# Patient Record
Sex: FEMALE | NOT HISPANIC OR LATINO | Employment: UNEMPLOYED | URBAN - METROPOLITAN AREA
[De-identification: names, ages, dates, MRNs, and addresses within clinical notes are randomized per-mention and may not be internally consistent; named-entity substitution may affect disease eponyms.]

---

## 2019-11-14 NOTE — PROGRESS NOTES
"  Adela is a 54 year old No obstetric history on file. female who presents for annual exam.     Besides routine health maintenance, {NO OTHER:889185}.    HPI:  The patient's PCP is *** Fei Roldan MD.  ***      GYNECOLOGIC HISTORY:    No LMP recorded.    Regular menses? { :814544}  Menses every *** days.  Length of menses: {NUMBERS 1-16:10} days    Her current contraception method is: {PLC CONTRACEPTION:463500}.  She  reports that she has never smoked. She does not have any smokeless tobacco history on file.  {Tobacco Cessation -- Delete if patient is a non-smoker:119225}  Patient {IS/IS NOT:9024} sexually active.  STD testing offered?  {GC/CHLAMYDIA:741769}  Last PHQ-9 score on record = No flowsheet data found.  Last GAD7 score on record = No flowsheet data found.  Alcohol Score = ***    HEALTH MAINTENANCE:  Cholesterol: (No results found for: CHOL ***  Last Mammo: {[facility] mammo:541031::\"One year ago\"}, Result: {[facility] normal:597994::\"Normal\"}, Next Mammo: {[facility] next mammo:543760::\"Today\"} ***  Pap: (No results found for: PAP )  ***  Colonoscopy:  ***, Result: {[facility] normal:431032::\"Normal\"}, Next Colonoscopy: *** years.  Dexa:  ***    Health maintenance updated:  {yes no:417480}    HISTORY:  OB History   No obstetric history on file.       Patient Active Problem List   Diagnosis     Blood disorder     No past surgical history on file.   Social History     Tobacco Use     Smoking status: Never Smoker   Substance Use Topics     Alcohol use: Yes     Comment: occasional      Problem (# of Occurrences) Relation (Name,Age of Onset)    Hypertension (1) Father: mild            Current Outpatient Medications   Medication Sig     ASPIRIN 81 MG PO TABS 1 TABLET DAILY     CALCIUM 500 MG PO TABS 1 TABLET THREE TIMES A DAY WITH MEALS     FEXOFENADINE  MG PO TABS 1 TABLET DAILY     FOLIC ACID 0.8 MG PO CAPS TWICE A DAY     FOSAMAX 35 MG PO TABS 1 TABLET WEEKLY ON AN EMPTY STOMACH     SYNTHROID 50 MCG PO TABS 1 TABLET DAILY     No " "current facility-administered medications for this visit.      Allergies   Allergen Reactions     Codeine GI Disturbance     Sulfa Drugs Rash       Past medical, surgical, social and family histories were reviewed and updated in EPIC.    ROS:   {[facility] ROSGYN:476672::\"12 point review of systems negative other than symptoms noted below.\"}    EXAM:  There were no vitals taken for this visit.   BMI: There is no height or weight on file to calculate BMI.    PHYSICAL EXAM:  Constitutional:   Appearance: Well nourished, well developed, alert, in no acute distress  Neck:  Lymph Nodes:  No lymphadenopathy present    Thyroid:  Gland size normal, nontender, no nodules or masses present  on palpation  Chest:  Respiratory Effort:  Breathing unlabored  Cardiovascular:    Heart: Auscultation:  Regular rate, normal rhythm, no murmurs present  Breasts: {[facility] Breast exam:320382}  Gastrointestinal:   Abdominal Examination:  Abdomen nontender to palpation, tone normal without rigidity or guarding, no masses present, umbilicus without lesions   Liver and Spleen:  No hepatomegaly present, liver nontender to palpation    Hernias:  No hernias present  Lymphatic: Lymph Nodes:  No other lymphadenopathy present  Skin:  General Inspection:  No rashes present, no lesions present, no areas of  discoloration  Neurologic:    Mental Status:  Oriented X3.  Normal strength and tone, sensory exam                grossly normal, mentation intact and speech normal.    Psychiatric:   Mentation appears normal and affect normal/bright.         {[facility] Pelvic Exam:401489}    COUNSELING:   {FEMALE COUNSELING MESSAGES:439464::\"Reviewed preventive health counseling, as reflected in patient instructions\"}    BMI: There is no height or weight on file to calculate BMI.  {Obesity Action Plan -- Delete if BMA < 25:577377}    ASSESSMENT:  54 year old female with satisfactory annual exam.    ICD-10-CM    1. Encounter for gynecological examination without abnormal finding " "Z01.419        PLAN:  ***    Harrison Alcocer MD  Adela is a 54 year old No obstetric history on file. female who presents for annual exam.     Besides routine health maintenance, {NO OTHER:541173}.    HPI:  The patient's PCP is *** Fei Roldan MD.  ***      GYNECOLOGIC HISTORY:    No LMP recorded.    Regular menses? { :071833}  Menses every *** days.  Length of menses: {NUMBERS 1-16:10} days    Her current contraception method is: {PLC CONTRACEPTION:665216}.  She  reports that she has never smoked. She does not have any smokeless tobacco history on file.  {Tobacco Cessation -- Delete if patient is a non-smoker:310815}  Patient {IS/IS NOT:9024} sexually active.  STD testing offered?  {GC/CHLAMYDIA:101142}  Last PHQ-9 score on record = No flowsheet data found.  Last GAD7 score on record = No flowsheet data found.  Alcohol Score = ***    HEALTH MAINTENANCE:  Cholesterol: (No results found for: CHOL ***  Last Mammo: {[facility] mammo:612037::\"One year ago\"}, Result: {[facility] normal:252256::\"Normal\"}, Next Mammo: {[facility] next mammo:978605::\"Today\"} ***  Pap: (No results found for: PAP )  ***  Colonoscopy:  ***, Result: {[facility] normal:976892::\"Normal\"}, Next Colonoscopy: *** years.  Dexa:  ***    Health maintenance updated:  {yes no:071826}    HISTORY:  OB History   No obstetric history on file.       Patient Active Problem List   Diagnosis     Blood disorder     No past surgical history on file.   Social History     Tobacco Use     Smoking status: Never Smoker   Substance Use Topics     Alcohol use: Yes     Comment: occasional      Problem (# of Occurrences) Relation (Name,Age of Onset)    Hypertension (1) Father: mild            Current Outpatient Medications   Medication Sig     ASPIRIN 81 MG PO TABS 1 TABLET DAILY     CALCIUM 500 MG PO TABS 1 TABLET THREE TIMES A DAY WITH MEALS     FEXOFENADINE  MG PO TABS 1 TABLET DAILY     FOLIC ACID 0.8 MG PO CAPS TWICE A DAY     FOSAMAX 35 MG PO TABS 1 TABLET WEEKLY ON AN EMPTY STOMACH " "    SYNTHROID 50 MCG PO TABS 1 TABLET DAILY     No current facility-administered medications for this visit.      Allergies   Allergen Reactions     Codeine GI Disturbance     Sulfa Drugs Rash       Past medical, surgical, social and family histories were reviewed and updated in EPIC.    ROS:   {[facility] ROSGYN:006738::\"12 point review of systems negative other than symptoms noted below.\"}    EXAM:  There were no vitals taken for this visit.   BMI: There is no height or weight on file to calculate BMI.    PHYSICAL EXAM:  Constitutional:   Appearance: Well nourished, well developed, alert, in no acute distress  Neck:  Lymph Nodes:  No lymphadenopathy present    Thyroid:  Gland size normal, nontender, no nodules or masses present  on palpation  Chest:  Respiratory Effort:  Breathing unlabored  Cardiovascular:    Heart: Auscultation:  Regular rate, normal rhythm, no murmurs present  Breasts: {[facility] Breast exam:310867}  Gastrointestinal:   Abdominal Examination:  Abdomen nontender to palpation, tone normal without rigidity or guarding, no masses present, umbilicus without lesions   Liver and Spleen:  No hepatomegaly present, liver nontender to palpation    Hernias:  No hernias present  Lymphatic: Lymph Nodes:  No other lymphadenopathy present  Skin:  General Inspection:  No rashes present, no lesions present, no areas of  discoloration  Neurologic:    Mental Status:  Oriented X3.  Normal strength and tone, sensory exam                grossly normal, mentation intact and speech normal.    Psychiatric:   Mentation appears normal and affect normal/bright.         {[facility] Pelvic Exam:806545}    COUNSELING:   {FEMALE COUNSELING MESSAGES:488584::\"Reviewed preventive health counseling, as reflected in patient instructions\"}    BMI: There is no height or weight on file to calculate BMI.  {Obesity Action Plan -- Delete if BMA < 25:297974}    ASSESSMENT:  54 year old female with satisfactory annual exam.    ICD-10-CM    1. Encounter for " gynecological examination without abnormal finding Z01.419        PLAN:  ***    Harrison Alcocer MD

## 2019-11-18 ENCOUNTER — OFFICE VISIT (OUTPATIENT)
Dept: OBGYN | Facility: CLINIC | Age: 54
End: 2019-11-18
Payer: MEDICARE

## 2019-11-18 VITALS
BODY MASS INDEX: 26.18 KG/M2 | HEIGHT: 71 IN | DIASTOLIC BLOOD PRESSURE: 76 MMHG | HEART RATE: 72 BPM | SYSTOLIC BLOOD PRESSURE: 112 MMHG | WEIGHT: 187 LBS

## 2019-11-18 DIAGNOSIS — Z01.419 ENCOUNTER FOR GYNECOLOGICAL EXAMINATION WITHOUT ABNORMAL FINDING: Primary | ICD-10-CM

## 2019-11-18 PROCEDURE — G0476 HPV COMBO ASSAY CA SCREEN: HCPCS | Performed by: OBSTETRICS & GYNECOLOGY

## 2019-11-18 PROCEDURE — 87624 HPV HI-RISK TYP POOLED RSLT: CPT | Performed by: OBSTETRICS & GYNECOLOGY

## 2019-11-18 PROCEDURE — G0101 CA SCREEN;PELVIC/BREAST EXAM: HCPCS | Performed by: OBSTETRICS & GYNECOLOGY

## 2019-11-18 PROCEDURE — G0123 SCREEN CERV/VAG THIN LAYER: HCPCS | Performed by: OBSTETRICS & GYNECOLOGY

## 2019-11-18 PROCEDURE — G0145 SCR C/V CYTO,THINLAYER,RESCR: HCPCS | Performed by: OBSTETRICS & GYNECOLOGY

## 2019-11-18 RX ORDER — ASPIRIN 325 MG
1 TABLET ORAL EVERY 24 HOURS
COMMUNITY

## 2019-11-18 ASSESSMENT — ANXIETY QUESTIONNAIRES
IF YOU CHECKED OFF ANY PROBLEMS ON THIS QUESTIONNAIRE, HOW DIFFICULT HAVE THESE PROBLEMS MADE IT FOR YOU TO DO YOUR WORK, TAKE CARE OF THINGS AT HOME, OR GET ALONG WITH OTHER PEOPLE: NOT DIFFICULT AT ALL
GAD7 TOTAL SCORE: 0
6. BECOMING EASILY ANNOYED OR IRRITABLE: NOT AT ALL
7. FEELING AFRAID AS IF SOMETHING AWFUL MIGHT HAPPEN: NOT AT ALL
2. NOT BEING ABLE TO STOP OR CONTROL WORRYING: NOT AT ALL
3. WORRYING TOO MUCH ABOUT DIFFERENT THINGS: NOT AT ALL
1. FEELING NERVOUS, ANXIOUS, OR ON EDGE: NOT AT ALL
5. BEING SO RESTLESS THAT IT IS HARD TO SIT STILL: NOT AT ALL

## 2019-11-18 ASSESSMENT — PATIENT HEALTH QUESTIONNAIRE - PHQ9
5. POOR APPETITE OR OVEREATING: NOT AT ALL
SUM OF ALL RESPONSES TO PHQ QUESTIONS 1-9: 2

## 2019-11-18 ASSESSMENT — MIFFLIN-ST. JEOR: SCORE: 1541.42

## 2019-11-18 NOTE — PROGRESS NOTES
Adela is a 54 year old No obstetric history on file. female who presents for Medicare Limited exam.     Do you have a Health Care Directive?: No, advance care planning information given to patient to review.  Patient plans to discuss their wishes with loved ones or provider.      Fall risk:   Fallen 2 or more times in the past year?: No  Any fall with injury in the past year?: No    HPI : The patient is seen at this time for her annual exam.  We have not seen her in 5 years.  She has had a history of blood clots and cannot take any estrogen replacement.  She has very few hot flashes and night sweats but does have some issues with vaginal dryness.      GYNECOLOGIC HISTORY:  No LMP recorded. Patient has had a hysterectomy..   reports that she has never smoked. She has never used smokeless tobacco.    STD testing offered?  Declined  Last PHQ-9 score on record=   PHQ-9 SCORE 2019   PHQ-9 Total Score 2     Last GAD7 score on record=   CAYLA-7 SCORE 2019   Total Score 0       HEALTH MAINTENANCE:  Cholesterol: (No results found for: CHOL   Last Mammo: years ago, Result: Normal, Next Mammo: will need to schedule   Pap: years ago  DEXA:  2 years ago  Colonoscopy:  2018, Result:  polyps, Next Colonoscopy: 5  years.    HISTORY:  OB History    Para Term  AB Living   1 1 0 1 0 1   SAB TAB Ectopic Multiple Live Births   0 0 0 0 1      # Outcome Date GA Lbr Jarrod/2nd Weight Sex Delivery Anes PTL Lv   1               Past Medical History:   Diagnosis Date     Arthritis      History reviewed. No pertinent surgical history.  Family History   Problem Relation Age of Onset     Hypertension Father         mild     Diabetes Maternal Grandmother      Heart Disease Maternal Grandfather      Social History     Socioeconomic History     Marital status:      Spouse name: Not on file     Number of children: Not on file     Years of education: Not on file     Highest education level: Not on file  "  Occupational History     Not on file   Social Needs     Financial resource strain: Not on file     Food insecurity:     Worry: Not on file     Inability: Not on file     Transportation needs:     Medical: Not on file     Non-medical: Not on file   Tobacco Use     Smoking status: Never Smoker   Substance and Sexual Activity     Alcohol use: Yes     Comment: occasional     Drug use: Not on file     Sexual activity: Not on file   Lifestyle     Physical activity:     Days per week: Not on file     Minutes per session: Not on file     Stress: Not on file   Relationships     Social connections:     Talks on phone: Not on file     Gets together: Not on file     Attends Hinduism service: Not on file     Active member of club or organization: Not on file     Attends meetings of clubs or organizations: Not on file     Relationship status: Not on file     Intimate partner violence:     Fear of current or ex partner: Not on file     Emotionally abused: Not on file     Physically abused: Not on file     Forced sexual activity: Not on file   Other Topics Concern     Parent/sibling w/ CABG, MI or angioplasty before 65F 55M? Not Asked   Social History Narrative     Not on file     Current Outpatient Medications   Medication Sig     aspirin (ASA) 325 MG tablet Take 1 tablet by mouth every 24 hours     CALCIUM 500 MG PO TABS 1 TABLET THREE TIMES A DAY WITH MEALS     SYNTHROID 50 MCG PO TABS 1 TABLET DAILY     No current facility-administered medications for this visit.      Allergies   Allergen Reactions     Codeine GI Disturbance     Sulfa Drugs Rash       Past medical, surgical, social and family history were reviewed and updated in [facility].    EXAM:  /76   Pulse 72   Ht 1.791 m (5' 10.5\")   Wt 84.8 kg (187 lb)   BMI 26.45 kg/m     BMI: Body mass index is 26.45 kg/m .    Constitutional: Appearance: Well nourished, well developed alert, in no acute distress  Breasts: Inspection of Breasts:  No lymphadenopathy " present    Palpation of Breasts and Axillae:  No masses present on palpation, no  breast tenderness    Axillary Lymph Nodes:  No lymphadenopathy present  Neurologic/Psychiatric:    Mental Status:  Oriented X3     Pelvic Exam:  External Genitalia:     Normal appearance for age, no discharge present, no tenderness present, no inflammatory lesions present, color normal  Vagina:     Normal vaginal vault without central or paravaginal defects, no discharge present, no inflammatory lesions present, no masses present  Bladder:     Nontender to palpation  Urethra:   Urethral Body:  Urethra palpation normal, urethra structural support normal   Urethral Meatus:  No erythema or lesions present  Cervix:     Surgically absent  Uterus:     Surgically absent  Adnexa:     Surgically absent  Perineum:     Perineum within normal limits, no evidence of trauma, no rashes or skin lesions present  Anus:     Anus within normal limits, no hemorrhoids present  Inguinal Lymph Nodes:     No lymphadenopathy present    Body mass index is 26.45 kg/m .     reports that she has never smoked. She has never used smokeless tobacco.      ASSESSMENT:  54 year old female with satisfactory annual exam.  Vaginal atrophy commensurate with her menopausal status.  The patient has a past history of osteopenia and has taken Fosamax and Reclast.  She is followed by her endocrinologist for this    ICD-10-CM    1. Encounter for gynecological examination without abnormal finding Z01.419          PLAN/PATIENT INSTRUCTIONS:    We will convey the patient's Pap and mammogram results when available.  Harrison Alcocer MD

## 2019-11-18 NOTE — LETTER
November 25, 2019    Adela Dinero  [address]  [place] [address]    Dear ,  This letter is regarding your recent Pap smear (cervical cancer screening) and Human Papillomavirus (HPV) test.  We are happy to inform you that your Pap smear result is normal. Cervical cancer is closely linked with certain types of HPV. Your results showed no evidence of high-risk HPV.  You will still need to return to the clinic every year for an annual exam and other preventive tests.  If you have additional questions regarding this result, please call our registered nurse, Yasmin at [phone].  Sincerely,    Harrison Alcocer MD/marta

## 2019-11-19 ENCOUNTER — ANCILLARY PROCEDURE (OUTPATIENT)
Dept: MAMMOGRAPHY | Facility: CLINIC | Age: 54
End: 2019-11-19
Payer: MEDICARE

## 2019-11-19 DIAGNOSIS — Z12.31 VISIT FOR SCREENING MAMMOGRAM: ICD-10-CM

## 2019-11-19 PROCEDURE — 77067 SCR MAMMO BI INCL CAD: CPT | Mod: TC

## 2019-11-19 PROCEDURE — 77063 BREAST TOMOSYNTHESIS BI: CPT | Mod: TC

## 2019-11-19 RX ORDER — LORAZEPAM 0.5 MG/1
0.5 TABLET ORAL PRN
COMMUNITY

## 2019-11-19 RX ORDER — LEVOTHYROXINE SODIUM 75 UG/1
75 TABLET ORAL DAILY
COMMUNITY

## 2019-11-19 RX ORDER — LORATADINE 10 MG/1
10 TABLET ORAL AT BEDTIME
COMMUNITY
End: 2020-10-14

## 2019-11-19 ASSESSMENT — ANXIETY QUESTIONNAIRES: GAD7 TOTAL SCORE: 0

## 2019-11-20 ENCOUNTER — HOSPITAL ENCOUNTER (OUTPATIENT)
Facility: CLINIC | Age: 54
Discharge: HOME | End: 2019-11-20
Attending: COLON & RECTAL SURGERY | Admitting: COLON & RECTAL SURGERY
Payer: MEDICARE

## 2019-11-20 ENCOUNTER — ANESTHESIA EVENT (OUTPATIENT)
Dept: SURGERY | Facility: CLINIC | Age: 54
End: 2019-11-20
Payer: MEDICARE

## 2019-11-20 ENCOUNTER — ANESTHESIA (OUTPATIENT)
Dept: SURGERY | Facility: CLINIC | Age: 54
End: 2019-11-20
Payer: MEDICARE

## 2019-11-20 VITALS
TEMPERATURE: 97.7 F | OXYGEN SATURATION: 98 % | HEIGHT: 70 IN | RESPIRATION RATE: 16 BRPM | HEART RATE: 66 BPM | BODY MASS INDEX: 26.64 KG/M2 | SYSTOLIC BLOOD PRESSURE: 124 MMHG | WEIGHT: 186.1 LBS | DIASTOLIC BLOOD PRESSURE: 84 MMHG

## 2019-11-20 LAB
ANION GAP SERPL CALCULATED.3IONS-SCNC: 5 MMOL/L (ref 3–14)
BUN SERPL-MCNC: 17 MG/DL (ref 7–30)
CALCIUM SERPL-MCNC: 9.2 MG/DL (ref 8.5–10.1)
CHLORIDE SERPL-SCNC: 108 MMOL/L (ref 94–109)
CO2 SERPL-SCNC: 26 MMOL/L (ref 20–32)
CREAT SERPL-MCNC: 0.96 MG/DL (ref 0.52–1.04)
GFR SERPL CREATININE-BSD FRML MDRD: 67 ML/MIN/{1.73_M2}
GLUCOSE SERPL-MCNC: 88 MG/DL (ref 70–99)
POTASSIUM SERPL-SCNC: 4 MMOL/L (ref 3.4–5.3)
SODIUM SERPL-SCNC: 139 MMOL/L (ref 133–144)

## 2019-11-20 PROCEDURE — 71000027 ZZH RECOVERY PHASE 2 EACH 15 MINS: Performed by: COLON & RECTAL SURGERY

## 2019-11-20 PROCEDURE — 36415 COLL VENOUS BLD VENIPUNCTURE: CPT | Performed by: COLON & RECTAL SURGERY

## 2019-11-20 PROCEDURE — 25800030 ZZH RX IP 258 OP 636: Performed by: NURSE ANESTHETIST, CERTIFIED REGISTERED

## 2019-11-20 PROCEDURE — 37000008 ZZH ANESTHESIA TECHNICAL FEE, 1ST 30 MIN: Performed by: COLON & RECTAL SURGERY

## 2019-11-20 PROCEDURE — 40000170 ZZH STATISTIC PRE-PROCEDURE ASSESSMENT II: Performed by: COLON & RECTAL SURGERY

## 2019-11-20 PROCEDURE — 25000132 ZZH RX MED GY IP 250 OP 250 PS 637: Mod: GY | Performed by: COLON & RECTAL SURGERY

## 2019-11-20 PROCEDURE — 25000576 ZZH RX IP 250 OP 636 J0585: Performed by: COLON & RECTAL SURGERY

## 2019-11-20 PROCEDURE — 25000125 ZZHC RX 250: Performed by: COLON & RECTAL SURGERY

## 2019-11-20 PROCEDURE — 36000044 ZZH SURGERY LEVEL 1 1ST 30 MIN: Performed by: COLON & RECTAL SURGERY

## 2019-11-20 PROCEDURE — 25000128 H RX IP 250 OP 636: Performed by: COLON & RECTAL SURGERY

## 2019-11-20 PROCEDURE — 80048 BASIC METABOLIC PNL TOTAL CA: CPT | Performed by: COLON & RECTAL SURGERY

## 2019-11-20 PROCEDURE — 71000012 ZZH RECOVERY PHASE 1 LEVEL 1 FIRST HR: Performed by: COLON & RECTAL SURGERY

## 2019-11-20 PROCEDURE — 25000128 H RX IP 250 OP 636: Performed by: NURSE ANESTHETIST, CERTIFIED REGISTERED

## 2019-11-20 RX ORDER — ONDANSETRON 2 MG/ML
4 INJECTION INTRAMUSCULAR; INTRAVENOUS EVERY 30 MIN PRN
Status: DISCONTINUED | OUTPATIENT
Start: 2019-11-20 | End: 2019-11-20 | Stop reason: HOSPADM

## 2019-11-20 RX ORDER — HYDROMORPHONE HYDROCHLORIDE 1 MG/ML
.3-.5 INJECTION, SOLUTION INTRAMUSCULAR; INTRAVENOUS; SUBCUTANEOUS EVERY 5 MIN PRN
Status: DISCONTINUED | OUTPATIENT
Start: 2019-11-20 | End: 2019-11-20 | Stop reason: HOSPADM

## 2019-11-20 RX ORDER — SODIUM CHLORIDE, SODIUM LACTATE, POTASSIUM CHLORIDE, CALCIUM CHLORIDE 600; 310; 30; 20 MG/100ML; MG/100ML; MG/100ML; MG/100ML
INJECTION, SOLUTION INTRAVENOUS CONTINUOUS
Status: DISCONTINUED | OUTPATIENT
Start: 2019-11-20 | End: 2019-11-20 | Stop reason: HOSPADM

## 2019-11-20 RX ORDER — BUPIVACAINE HYDROCHLORIDE 5 MG/ML
INJECTION, SOLUTION PERINEURAL PRN
Status: DISCONTINUED | OUTPATIENT
Start: 2019-11-20 | End: 2019-11-20 | Stop reason: HOSPADM

## 2019-11-20 RX ORDER — SODIUM CHLORIDE, SODIUM LACTATE, POTASSIUM CHLORIDE, CALCIUM CHLORIDE 600; 310; 30; 20 MG/100ML; MG/100ML; MG/100ML; MG/100ML
INJECTION, SOLUTION INTRAVENOUS CONTINUOUS PRN
Status: DISCONTINUED | OUTPATIENT
Start: 2019-11-20 | End: 2019-11-20

## 2019-11-20 RX ORDER — ACETAMINOPHEN 325 MG/1
975 TABLET ORAL ONCE
Status: COMPLETED | OUTPATIENT
Start: 2019-11-20 | End: 2019-11-20

## 2019-11-20 RX ORDER — MAGNESIUM HYDROXIDE 1200 MG/15ML
LIQUID ORAL PRN
Status: DISCONTINUED | OUTPATIENT
Start: 2019-11-20 | End: 2019-11-20 | Stop reason: HOSPADM

## 2019-11-20 RX ORDER — NALOXONE HYDROCHLORIDE 0.4 MG/ML
.1-.4 INJECTION, SOLUTION INTRAMUSCULAR; INTRAVENOUS; SUBCUTANEOUS
Status: DISCONTINUED | OUTPATIENT
Start: 2019-11-20 | End: 2019-11-20 | Stop reason: HOSPADM

## 2019-11-20 RX ORDER — PROPOFOL 10 MG/ML
INJECTION, EMULSION INTRAVENOUS CONTINUOUS PRN
Status: DISCONTINUED | OUTPATIENT
Start: 2019-11-20 | End: 2019-11-20

## 2019-11-20 RX ORDER — FENTANYL CITRATE 50 UG/ML
25-50 INJECTION, SOLUTION INTRAMUSCULAR; INTRAVENOUS
Status: DISCONTINUED | OUTPATIENT
Start: 2019-11-20 | End: 2019-11-20 | Stop reason: HOSPADM

## 2019-11-20 RX ORDER — ONDANSETRON 4 MG/1
4 TABLET, ORALLY DISINTEGRATING ORAL EVERY 30 MIN PRN
Status: DISCONTINUED | OUTPATIENT
Start: 2019-11-20 | End: 2019-11-20 | Stop reason: HOSPADM

## 2019-11-20 RX ADMIN — PROPOFOL 150 MCG/KG/MIN: 10 INJECTION, EMULSION INTRAVENOUS at 15:27

## 2019-11-20 RX ADMIN — SODIUM CHLORIDE, POTASSIUM CHLORIDE, SODIUM LACTATE AND CALCIUM CHLORIDE: 600; 310; 30; 20 INJECTION, SOLUTION INTRAVENOUS at 15:27

## 2019-11-20 RX ADMIN — MIDAZOLAM 2 MG: 1 INJECTION INTRAMUSCULAR; INTRAVENOUS at 15:27

## 2019-11-20 RX ADMIN — ACETAMINOPHEN 975 MG: 325 TABLET, FILM COATED ORAL at 12:37

## 2019-11-20 ASSESSMENT — MIFFLIN-ST. JEOR: SCORE: 1529.39

## 2019-11-20 ASSESSMENT — LIFESTYLE VARIABLES: TOBACCO_USE: 0

## 2019-11-20 NOTE — OP NOTE
PREOPERATIVE DIAGNOSIS   Chronic anal fissure.     POSTOPERATIVE DIAGNOSIS   Chronic anal fissure.     OPERATION PERFORMED   1. Examination under anesthesia.   2. Anal Botox injection.    SURGEON: Roesmarie Henriquez MD    ASSISTANT: Carly Forbes PA-C    ANESTHESIA: MAC.     INDICATION: Adela Dinero is a 54 year old female with a history of anal pain and intermittent bright red blood per rectum when she has bowel movements. The pain lasts for several hours after having a bowel movement. She has a posterior midline anal fissure that has persisted despite maximal medical management.  The patient will now undergo exam under anesthesia with possible Botox injection.     OPERATIVE FINDINGS: The patient has a chronic-appearing anal fissure in the posterior midline measuring 4 to 5 mm in length with exposure of internal sphincter fibers.  She continues to have strong sphincter tone on exam.  Anorectal mucosa is normal.  She has enlarged internal hemorrhoids.    PROCEDURE IN DETAIL: After informed consent was obtained, the patient was brought to the operating room. The patient was then flipped into the well-padded prone jackknife position with the buttocks taped apart. Sedative medications were administered by the anesthesia service. The perianal region was sterilely prepped and draped in standard fashion. A deep, chronic-appearing anal fissure was present in the posterior midline with exposed internal sphincter fibers. A 100 unit vile of Botox was diluted with 2 mL of injectable saline.  Divided doses of 0.5 mL (25 units) was injected into the intersphincteric groove using a 25-gauge needle in the right lateral, left lateral, positions. Therefore, 75 units of Botox were injected.     A total of 30 mL of 0.5% Marcaine was injected into the perianal region for prolonged postoperative analgesia. The patient tolerated this procedure well without complications. Estimated blood loss was 1 mL. I was present and scrubbed for the  entire duration of the operation. The patient was returned to the supine position, and brought to same day surgery in stable condition.     Rosemarie Henriquez MD

## 2019-11-20 NOTE — BRIEF OP NOTE
[facility]    Brief Operative Note    Pre-operative diagnosis: Fissure, anal [K60.2]  Post-operative diagnosis Same as pre-operative diagnosis    Procedure: Procedure(s):  EXAM UNDER ANESTHESIA  PERIANAL BOTOX (100 UNITS)  Surgeon: Surgeon(s) and Role:     * Pily Henriquez MD - Primary     * Carly Forbes PA-C - Resident - Assisting  Anesthesia: Monitor Anesthesia Care   Estimated blood loss: 1 cc  Drains: None  Specimens: * No specimens in log *  Findings:   posterior anal fissure.  Complications: None.  Implants: * No implants in log *      Carly Forbes PA-C on 11/20/2019 at 3:50 PM

## 2019-11-20 NOTE — ANESTHESIA POSTPROCEDURE EVALUATION
Patient: Adela Dinero    Procedure(s):  EXAM UNDER ANESTHESIA  PERIANAL BOTOX (100 UNITS)    Diagnosis:Fissure, anal [K60.2]  Diagnosis Additional Information: No value filed.    Anesthesia Type:  MAC    Note:  Anesthesia Post Evaluation    Patient location during evaluation: PACU  Patient participation: Able to fully participate in evaluation  Level of consciousness: awake and alert  Pain management: adequate  Airway patency: patent  Cardiovascular status: acceptable  Respiratory status: acceptable  Hydration status: acceptable  PONV: none     Anesthetic complications: None          Last vitals:  Vitals:    11/20/19 1549 11/20/19 1600 11/20/19 1615   BP: 124/75 124/87 (!) 138/97   Pulse:  65 66   Resp: 16 18 8   Temp: 36.5  C (97.7  F) 36.5  C (97.7  F) 36.5  C (97.7  F)   SpO2: 99% 100% 97%         Electronically Signed By: Tico Tapia MD  November 20, 2019  4:45 PM

## 2019-11-20 NOTE — ANESTHESIA CARE TRANSFER NOTE
Patient: Adela Dinero    Procedure(s):  EXAM UNDER ANESTHESIA  PERIANAL BOTOX (100 UNITS)    Diagnosis: Fissure, anal [K60.2]  Diagnosis Additional Information: No value filed.    Anesthesia Type:   MAC     Note:  Airway :Room Air  Patient transferred to:PACU  Comments: At end of procedure, spontaneous respirations, patient alert to voice, able to follow commands. Patient breathing room air to PACU. SpO2, NiBP, and EKG monitors and alarms on and functioning, Garry Hugger warmer connected to patient gown, report on patient's clinical status given to PACU RN, RN questions answered.Handoff Report: Identifed the Patient, Identified the Reponsible Provider, Reviewed the pertinent medical history, Discussed the surgical course, Reviewed Intra-OP anesthesia mangement and issues during anesthesia, Set expectations for post-procedure period and Allowed opportunity for questions and acknowledgement of understanding      Vitals: (Last set prior to Anesthesia Care Transfer)    CRNA VITALS  11/20/2019 1516 - 11/20/2019 1552      11/20/2019             SpO2:  100 %                Electronically Signed By: APRIL William CRNA  November 20, 2019  3:52 PM

## 2019-11-20 NOTE — DISCHARGE INSTRUCTIONS
Today you were given 975 mg of Tylenol at 12:35 pm. The recommended daily maximum dose is 4000 mg.     Same Day Surgery Discharge Instructions for  Sedation and General Anesthesia       It's not unusual to feel dizzy, light-headed or faint for up to 24 hours after surgery or while taking pain medication.  If you have these symptoms: sit for a few minutes before standing and have someone assist you when you get up to walk or use the bathroom.      You should rest and relax for the next 24 hours. We recommend you make arrangements to have an adult stay with you for at least 24 hours after your discharge.  Avoid hazardous and strenuous activity.      DO NOT DRIVE any vehicle or operate mechanical equipment for 24 hours following the end of your surgery.  Even though you may feel normal, your reactions may be affected by the medication you have received.      Do not drink alcoholic beverages for 24 hours following surgery.       Slowly progress to your regular diet as you feel able. It's not unusual to feel nauseated and/or vomit after receiving anesthesia.  If you develop these symptoms, drink clear liquids (apple juice, ginger ale, broth, 7-up, etc. ) until you feel better.  If your nausea and vomiting persists for 24 hours, please notify your surgeon.        All narcotic pain medications, along with inactivity and anesthesia, can cause constipation. Drinking plenty of liquids and increasing fiber intake will help.      For any questions of a medical nature, call your surgeon.      Do not make important decisions for 24 hours.      If you had general anesthesia, you may have a sore throat for a couple of days related to the breathing tube used during surgery.  You may use Cepacol lozenges to help with this discomfort.  If it worsens or if you develop a fever, contact your surgeon.       If you feel your pain is not well managed with the pain medications prescribed by your surgeon, please contact your surgeon's office  to let them know so they can address your concerns.     Reasons to contact your surgeon:    1. Signs of possible infection: Check your incision daily for redness, swelling, warmth, red streaks or foul drainage.   2. Elevated temperature.  3. Pain not controlled with pain medication and/or rest.   4. Uncontrolled nausea or vomiting.  5. Any questions or concerns.        **If you have questions or concerns about your procedure,  call Dr. Henriquez at [phone]**

## 2019-11-21 LAB
COPATH REPORT: NORMAL
PAP: NORMAL

## 2019-11-22 LAB
FINAL DIAGNOSIS: NORMAL
HPV HR 12 DNA CVX QL NAA+PROBE: NEGATIVE
HPV16 DNA SPEC QL NAA+PROBE: NEGATIVE
HPV18 DNA SPEC QL NAA+PROBE: NEGATIVE
SPECIMEN DESCRIPTION: NORMAL
SPECIMEN SOURCE CVX/VAG CYTO: NORMAL

## 2020-02-05 ENCOUNTER — ANESTHESIA EVENT (OUTPATIENT)
Dept: SURGERY | Facility: CLINIC | Age: 55
End: 2020-02-05
Payer: COMMERCIAL

## 2020-02-05 ENCOUNTER — HOSPITAL ENCOUNTER (OUTPATIENT)
Facility: CLINIC | Age: 55
Discharge: HOME | End: 2020-02-05
Attending: COLON & RECTAL SURGERY | Admitting: COLON & RECTAL SURGERY
Payer: COMMERCIAL

## 2020-02-05 ENCOUNTER — ANESTHESIA (OUTPATIENT)
Dept: SURGERY | Facility: CLINIC | Age: 55
End: 2020-02-05
Payer: COMMERCIAL

## 2020-02-05 VITALS
TEMPERATURE: 96.6 F | DIASTOLIC BLOOD PRESSURE: 85 MMHG | HEART RATE: 41 BPM | SYSTOLIC BLOOD PRESSURE: 111 MMHG | WEIGHT: 186.4 LBS | OXYGEN SATURATION: 99 % | RESPIRATION RATE: 16 BRPM | BODY MASS INDEX: 26.69 KG/M2 | HEIGHT: 70 IN

## 2020-02-05 DIAGNOSIS — K60.30 ANAL FISSURE AND FISTULA: Primary | ICD-10-CM

## 2020-02-05 DIAGNOSIS — K60.2 ANAL FISSURE AND FISTULA: Primary | ICD-10-CM

## 2020-02-05 PROCEDURE — 36000050 ZZH SURGERY LEVEL 2 1ST 30 MIN: Performed by: COLON & RECTAL SURGERY

## 2020-02-05 PROCEDURE — 37000008 ZZH ANESTHESIA TECHNICAL FEE, 1ST 30 MIN: Performed by: COLON & RECTAL SURGERY

## 2020-02-05 PROCEDURE — 25000125 ZZHC RX 250: Performed by: COLON & RECTAL SURGERY

## 2020-02-05 PROCEDURE — 25800030 ZZH RX IP 258 OP 636: Performed by: NURSE ANESTHETIST, CERTIFIED REGISTERED

## 2020-02-05 PROCEDURE — 40000169 ZZH STATISTIC PRE-PROCEDURE ASSESSMENT I: Performed by: COLON & RECTAL SURGERY

## 2020-02-05 PROCEDURE — 25000132 ZZH RX MED GY IP 250 OP 250 PS 637: Performed by: COLON & RECTAL SURGERY

## 2020-02-05 PROCEDURE — 27210794 ZZH OR GENERAL SUPPLY STERILE: Performed by: COLON & RECTAL SURGERY

## 2020-02-05 PROCEDURE — 25000128 H RX IP 250 OP 636: Performed by: COLON & RECTAL SURGERY

## 2020-02-05 PROCEDURE — 71000027 ZZH RECOVERY PHASE 2 EACH 15 MINS: Performed by: COLON & RECTAL SURGERY

## 2020-02-05 PROCEDURE — 36000052 ZZH SURGERY LEVEL 2 EA 15 ADDTL MIN: Performed by: COLON & RECTAL SURGERY

## 2020-02-05 PROCEDURE — 37000009 ZZH ANESTHESIA TECHNICAL FEE, EACH ADDTL 15 MIN: Performed by: COLON & RECTAL SURGERY

## 2020-02-05 PROCEDURE — 71000012 ZZH RECOVERY PHASE 1 LEVEL 1 FIRST HR: Performed by: COLON & RECTAL SURGERY

## 2020-02-05 PROCEDURE — 71000013 ZZH RECOVERY PHASE 1 LEVEL 1 EA ADDTL HR: Performed by: COLON & RECTAL SURGERY

## 2020-02-05 PROCEDURE — 25000128 H RX IP 250 OP 636: Performed by: NURSE ANESTHETIST, CERTIFIED REGISTERED

## 2020-02-05 PROCEDURE — 25000125 ZZHC RX 250: Performed by: NURSE ANESTHETIST, CERTIFIED REGISTERED

## 2020-02-05 RX ORDER — ACETAMINOPHEN 325 MG/1
975 TABLET ORAL ONCE
Status: COMPLETED | OUTPATIENT
Start: 2020-02-05 | End: 2020-02-05

## 2020-02-05 RX ORDER — OXYCODONE HYDROCHLORIDE 5 MG/1
5-10 TABLET ORAL
Status: DISCONTINUED | OUTPATIENT
Start: 2020-02-05 | End: 2020-02-05 | Stop reason: HOSPADM

## 2020-02-05 RX ORDER — MEPERIDINE HYDROCHLORIDE 25 MG/ML
12.5 INJECTION INTRAMUSCULAR; INTRAVENOUS; SUBCUTANEOUS
Status: DISCONTINUED | OUTPATIENT
Start: 2020-02-05 | End: 2020-02-05 | Stop reason: HOSPADM

## 2020-02-05 RX ORDER — ONDANSETRON 4 MG/1
4 TABLET, ORALLY DISINTEGRATING ORAL EVERY 30 MIN PRN
Status: DISCONTINUED | OUTPATIENT
Start: 2020-02-05 | End: 2020-02-05 | Stop reason: HOSPADM

## 2020-02-05 RX ORDER — BUPIVACAINE HYDROCHLORIDE 5 MG/ML
INJECTION, SOLUTION EPIDURAL; INTRACAUDAL
Status: DISCONTINUED
Start: 2020-02-05 | End: 2020-02-05 | Stop reason: HOSPADM

## 2020-02-05 RX ORDER — GLYCOPYRROLATE 0.2 MG/ML
INJECTION, SOLUTION INTRAMUSCULAR; INTRAVENOUS PRN
Status: DISCONTINUED | OUTPATIENT
Start: 2020-02-05 | End: 2020-02-05

## 2020-02-05 RX ORDER — ONDANSETRON 2 MG/ML
INJECTION INTRAMUSCULAR; INTRAVENOUS PRN
Status: DISCONTINUED | OUTPATIENT
Start: 2020-02-05 | End: 2020-02-05

## 2020-02-05 RX ORDER — DEXAMETHASONE SODIUM PHOSPHATE 4 MG/ML
INJECTION, SOLUTION INTRA-ARTICULAR; INTRALESIONAL; INTRAMUSCULAR; INTRAVENOUS; SOFT TISSUE PRN
Status: DISCONTINUED | OUTPATIENT
Start: 2020-02-05 | End: 2020-02-05

## 2020-02-05 RX ORDER — SODIUM CHLORIDE, SODIUM LACTATE, POTASSIUM CHLORIDE, CALCIUM CHLORIDE 600; 310; 30; 20 MG/100ML; MG/100ML; MG/100ML; MG/100ML
INJECTION, SOLUTION INTRAVENOUS CONTINUOUS
Status: DISCONTINUED | OUTPATIENT
Start: 2020-02-05 | End: 2020-02-05 | Stop reason: HOSPADM

## 2020-02-05 RX ORDER — OXYCODONE HYDROCHLORIDE 5 MG/1
5-10 TABLET ORAL EVERY 4 HOURS PRN
Qty: 12 TABLET | Refills: 0 | Status: ON HOLD | OUTPATIENT
Start: 2020-02-05 | End: 2020-10-14

## 2020-02-05 RX ORDER — PROPOFOL 10 MG/ML
INJECTION, EMULSION INTRAVENOUS CONTINUOUS PRN
Status: DISCONTINUED | OUTPATIENT
Start: 2020-02-05 | End: 2020-02-05

## 2020-02-05 RX ORDER — HYDROMORPHONE HYDROCHLORIDE 1 MG/ML
.3-.5 INJECTION, SOLUTION INTRAMUSCULAR; INTRAVENOUS; SUBCUTANEOUS EVERY 10 MIN PRN
Status: DISCONTINUED | OUTPATIENT
Start: 2020-02-05 | End: 2020-02-05 | Stop reason: HOSPADM

## 2020-02-05 RX ORDER — FENTANYL CITRATE 50 UG/ML
25-50 INJECTION, SOLUTION INTRAMUSCULAR; INTRAVENOUS
Status: DISCONTINUED | OUTPATIENT
Start: 2020-02-05 | End: 2020-02-05 | Stop reason: HOSPADM

## 2020-02-05 RX ORDER — MAGNESIUM HYDROXIDE 1200 MG/15ML
LIQUID ORAL PRN
Status: DISCONTINUED | OUTPATIENT
Start: 2020-02-05 | End: 2020-02-05 | Stop reason: HOSPADM

## 2020-02-05 RX ORDER — POLYETHYLENE GLYCOL 3350 17 G/17G
1 POWDER, FOR SOLUTION ORAL DAILY
COMMUNITY

## 2020-02-05 RX ORDER — PROPOFOL 10 MG/ML
INJECTION, EMULSION INTRAVENOUS PRN
Status: DISCONTINUED | OUTPATIENT
Start: 2020-02-05 | End: 2020-02-05

## 2020-02-05 RX ORDER — NEOSTIGMINE METHYLSULFATE 1 MG/ML
VIAL (ML) INJECTION PRN
Status: DISCONTINUED | OUTPATIENT
Start: 2020-02-05 | End: 2020-02-05

## 2020-02-05 RX ORDER — FENTANYL CITRATE 50 UG/ML
INJECTION, SOLUTION INTRAMUSCULAR; INTRAVENOUS PRN
Status: DISCONTINUED | OUTPATIENT
Start: 2020-02-05 | End: 2020-02-05

## 2020-02-05 RX ORDER — LIDOCAINE HYDROCHLORIDE 20 MG/ML
JELLY TOPICAL PRN
Qty: 30 ML | Refills: 1 | Status: ON HOLD | OUTPATIENT
Start: 2020-02-05 | End: 2020-10-14

## 2020-02-05 RX ORDER — BUPIVACAINE HYDROCHLORIDE 5 MG/ML
INJECTION, SOLUTION PERINEURAL PRN
Status: DISCONTINUED | OUTPATIENT
Start: 2020-02-05 | End: 2020-02-05 | Stop reason: HOSPADM

## 2020-02-05 RX ORDER — NALOXONE HYDROCHLORIDE 0.4 MG/ML
.1-.4 INJECTION, SOLUTION INTRAMUSCULAR; INTRAVENOUS; SUBCUTANEOUS
Status: DISCONTINUED | OUTPATIENT
Start: 2020-02-05 | End: 2020-02-05 | Stop reason: HOSPADM

## 2020-02-05 RX ORDER — SODIUM CHLORIDE, SODIUM LACTATE, POTASSIUM CHLORIDE, CALCIUM CHLORIDE 600; 310; 30; 20 MG/100ML; MG/100ML; MG/100ML; MG/100ML
INJECTION, SOLUTION INTRAVENOUS CONTINUOUS PRN
Status: DISCONTINUED | OUTPATIENT
Start: 2020-02-05 | End: 2020-02-05

## 2020-02-05 RX ORDER — ONDANSETRON 2 MG/ML
4 INJECTION INTRAMUSCULAR; INTRAVENOUS EVERY 30 MIN PRN
Status: DISCONTINUED | OUTPATIENT
Start: 2020-02-05 | End: 2020-02-05 | Stop reason: HOSPADM

## 2020-02-05 RX ORDER — LIDOCAINE HYDROCHLORIDE 20 MG/ML
INJECTION, SOLUTION INFILTRATION; PERINEURAL PRN
Status: DISCONTINUED | OUTPATIENT
Start: 2020-02-05 | End: 2020-02-05

## 2020-02-05 RX ADMIN — PHENYLEPHRINE HYDROCHLORIDE 100 MCG: 10 INJECTION INTRAVENOUS at 07:51

## 2020-02-05 RX ADMIN — ACETAMINOPHEN 975 MG: 325 TABLET, FILM COATED ORAL at 06:08

## 2020-02-05 RX ADMIN — GLYCOPYRROLATE 0.6 MG: 0.2 INJECTION, SOLUTION INTRAMUSCULAR; INTRAVENOUS at 07:59

## 2020-02-05 RX ADMIN — DEXAMETHASONE SODIUM PHOSPHATE 4 MG: 4 INJECTION, SOLUTION INTRA-ARTICULAR; INTRALESIONAL; INTRAMUSCULAR; INTRAVENOUS; SOFT TISSUE at 07:36

## 2020-02-05 RX ADMIN — NEOSTIGMINE METHYLSULFATE 4 MG: 1 INJECTION, SOLUTION INTRAVENOUS at 07:59

## 2020-02-05 RX ADMIN — ONDANSETRON 4 MG: 2 INJECTION INTRAMUSCULAR; INTRAVENOUS at 07:59

## 2020-02-05 RX ADMIN — PROPOFOL 150 MG: 10 INJECTION, EMULSION INTRAVENOUS at 07:27

## 2020-02-05 RX ADMIN — ROCURONIUM BROMIDE 30 MG: 10 INJECTION INTRAVENOUS at 07:28

## 2020-02-05 RX ADMIN — PROPOFOL 200 MCG/KG/MIN: 10 INJECTION, EMULSION INTRAVENOUS at 07:27

## 2020-02-05 RX ADMIN — PHENYLEPHRINE HYDROCHLORIDE 50 MCG: 10 INJECTION INTRAVENOUS at 07:58

## 2020-02-05 RX ADMIN — FENTANYL CITRATE 50 MCG: 50 INJECTION, SOLUTION INTRAMUSCULAR; INTRAVENOUS at 07:27

## 2020-02-05 RX ADMIN — FENTANYL CITRATE 50 MCG: 50 INJECTION, SOLUTION INTRAMUSCULAR; INTRAVENOUS at 07:36

## 2020-02-05 RX ADMIN — MIDAZOLAM 2 MG: 1 INJECTION INTRAMUSCULAR; INTRAVENOUS at 07:23

## 2020-02-05 RX ADMIN — SODIUM CHLORIDE, POTASSIUM CHLORIDE, SODIUM LACTATE AND CALCIUM CHLORIDE: 600; 310; 30; 20 INJECTION, SOLUTION INTRAVENOUS at 07:23

## 2020-02-05 RX ADMIN — DEXMEDETOMIDINE HYDROCHLORIDE 12 MCG: 100 INJECTION, SOLUTION INTRAVENOUS at 07:41

## 2020-02-05 RX ADMIN — PROPOFOL 50 MG: 10 INJECTION, EMULSION INTRAVENOUS at 07:29

## 2020-02-05 RX ADMIN — LIDOCAINE HYDROCHLORIDE 100 MG: 20 INJECTION, SOLUTION INFILTRATION; PERINEURAL at 07:27

## 2020-02-05 ASSESSMENT — MIFFLIN-ST. JEOR: SCORE: 1530.75

## 2020-02-05 ASSESSMENT — ENCOUNTER SYMPTOMS: ORTHOPNEA: 0

## 2020-02-05 ASSESSMENT — LIFESTYLE VARIABLES: TOBACCO_USE: 0

## 2020-02-05 NOTE — ANESTHESIA POSTPROCEDURE EVALUATION
Patient: Adela Dinero    Procedure(s):  EXAM UNDER ANESTHESIA  FISTULOTOMY    Diagnosis:Anal fissure [K60.2]  Anal fistula [K60.3]  Diagnosis Additional Information: No value filed.    Anesthesia Type:  General, ETT    Note:  Anesthesia Post Evaluation    Patient location during evaluation: PACU  Patient participation: Able to fully participate in evaluation  Level of consciousness: awake  Pain management: adequate  Airway patency: patent  Cardiovascular status: acceptable  Respiratory status: acceptable  Hydration status: acceptable  PONV: none     Anesthetic complications: None          Last vitals:  Vitals:    02/05/20 0845 02/05/20 0900 02/05/20 0915   BP: 108/82 114/80 111/85   Pulse: 55 56 (!) 41   Resp: 12 16 16   Temp: 35.6  C (96.1  F) 35.8  C (96.4  F) 35.9  C (96.6  F)   SpO2: 96% 99% 99%         Electronically Signed By: Fede Anthony MD  February 5, 2020  12:27 PM

## 2020-02-05 NOTE — DISCHARGE INSTRUCTIONS

## 2020-02-05 NOTE — ANESTHESIA CARE TRANSFER NOTE
Patient: Adela Dinero    Procedure(s):  EXAM UNDER ANESTHESIA  FISTULOTOMY    Diagnosis: Anal fissure [K60.2]  Anal fistula [K60.3]  Diagnosis Additional Information: No value filed.    Anesthesia Type:   General, ETT     Note:  Airway :Face Mask  Patient transferred to:PACU  Comments: Neuromuscular blockade reversed after TOF 4/4, spontaneous respirations, adequate tidal volumes, followed commands to voice, oropharynx suctioned with soft flexible catheter, extubated atraumatically, extubated with suction, airway patent after extubation.  Oxygen via facemask at 10 liters per minute to PACU. Oxygen tubing connected to wall O2 in PACU, SpO2, NiBP, and EKG monitors and alarms on and functioning, Garry Hugger warmer connected to patient gown, report on patient's clinical status given to PACU RN, RN questions answered. Handoff Report: Identifed the Patient, Identified the Reponsible Provider, Reviewed the pertinent medical history, Discussed the surgical course, Reviewed Intra-OP anesthesia mangement and issues during anesthesia, Set expectations for post-procedure period and Allowed opportunity for questions and acknowledgement of understanding      Vitals: (Last set prior to Anesthesia Care Transfer)    CRNA VITALS  2/5/2020 0746 - 2/5/2020 0821      2/5/2020             Resp Rate (observed):  16    EKG:  Sinus rhythm                Electronically Signed By: APRIL Ch CRNA  February 5, 2020  8:21 AM

## 2020-02-05 NOTE — OP NOTE
DATE OF SURGERY: February 5, 2020    PREOPERATIVE DIAGNOSIS: Anal fissure fistula     POSTOPERATIVE DIAGNOSIS: Same    PROCEDURE:   1. Exam under anesthesia.   2. Fistulotomy with division of muscle    SURGEON: Rosemarie Henriquez MD     ANESTHESIA: General.     INDICATION: Adela Dinero is a 54 year old female with a history of posterior midline anal fissure.  It was noted at the time of her initial exam in the office.  She was treated conservatively but failed maximal medical management. She also had perianal Botox injection with minimal improvement in her symptoms.  In the office it was noted that she had purulent drainage from near the fissure site, suspicious for an associated fissure fistula.  she was treated conservatively with topical cream but the fissure and fistula persisted.  I recommended and examination under anesthesia, possible fistulotomy, possible seton, possible lateral internal sphincterotomy.  The risks including bleeding infection or changes in continence were discussed with the patient and she agrees to proceed.    she will now undergo exam under anesthesia.     OPERATIVE FINDINGS:  The patient had an external fistula opening in the posterior midline position approximately 5 mm from the anal verge. The internal opening was located in the posterior midline in the fissure bed.  A fistulotomy was performed.  Since a small amount of internal muscle was divided, a lateral internal sphincterotomy was not performed.      PROCEDURE IN DETAIL: After informed consent was obtained, the patient was brought to the operating room, where general anesthesia was induced without difficulty. She was flipped into a well-padded prone jackknife position, with the buttocks taped apart. The perianal region was sterilely prepped and draped in the usual fashion. A P[place] bivalve retractor was inserted into the anal canal and the operative findings are noted above.   Perianal exam revealed a healing posterior midline  anal fissure.  She also had enlarged internal hemorrhoids.  Digital rectal exam was normal without any masses or lesions.  A [place] was inserted into the anal canal and an anal exam was performed.  A gently curve fistula probe was inserted into the external fistula opening in the posterior midline position and tracked easily into the internal opening in the posterior midline within the fissure bed.  The fistula tract involved a small portion of the internal sphincter complex.  Using electrocautery, we performed a fistulotomy dividing a small portion of the internal sphincter muscle.  Since the sphincter muscle was divided, I did not feel that a lateral internal sphincterotomy was necessary.  The fistula tract was curetted out.  The wound was marsupialized with a running absorbable suture.    0.5% Marcaine totaling 30 mL was instilled at the base of the fistulotomy site for postoperative analgesia and as a bilateral pudendal nerve block.  Sterile gauze dressings were applied.   The patient tolerated the procedure well, without complications. Estimated blood loss was 2 mL. I was present and scrubbed for the entire duration of the operation. The patient was returned to the supine position, extubated in the Operating Room and brought to the Recovery Room in stable condition.     Rosemarie Henriquez MD

## 2020-02-05 NOTE — ANESTHESIA PREPROCEDURE EVALUATION
Anesthesia Pre-Procedure Evaluation    Patient: Adela Dinero   MRN: [id number] : 1965          Preoperative Diagnosis: Anal fissure [K60.2]  Anal fistula [K60.3]    Procedure(s):  EXAM UNDER ANESTHESIA  POSSIBLE FISTULOTOMY, POSSIBLE  SETON,  POSSIBLE LATERAL INTERNAL SPHINCTEROTOMY    Past Medical History:   Diagnosis Date     Abdominal cramping     H/O     Acute maxillary sinusitis      Anal fissure      Arthritis      Chest pain      Colon adenoma     H/O     Dizziness      Endometriosis      Hemorrhoids, external      Hx of constipation      Idiopathic hypercalcemia      Infected epithelial inclusion cyst      Insomnia      Mass of right axilla      Motion sickness      Osteopenia      Osteoporosis      Pain of right clavicle      Plantar fasciitis      PONV (postoperative nausea and vomiting)      Right shoulder pain      Seborrheic keratosis      Thoracic outlet syndrome      Thyroid disease     hypothroidism     Thyroid nodule      Past Surgical History:   Procedure Laterality Date     APPENDECTOMY        SECTION       EXAM UNDER ANESTHESIA RECTUM N/A 2019    Procedure: EXAM UNDER ANESTHESIA;  Surgeon: Pily Henriquez MD;  Location:  OR     GYN SURGERY      LAPAROSCOPIES X 3     HYSTERECTOMY TOTAL ABDOMINAL       HYSTERECTOMY, PAP NO LONGER INDICATED       INJECT BOTOX N/A 2019    Procedure: PERIANAL BOTOX (100 UNITS);  Surgeon: Pily Henriquez MD;  Location:  OR     LAPAROSCOPY      , ,      rib surgery      , ,      SOFT TISSUE SURGERY  2008    SEBACIOUS CYST REMOVED     THORACIC SURGERY Right 2002    1ST RIB REMOVED     TONSILLECTOMY  2008       Anesthesia Evaluation     . Pt has had prior anesthetic.     History of anesthetic complications   - PONV and motion sickness        ROS/MED HX    ENT/Pulmonary: Comment: Recurrent sinusitis     (-) tobacco use and sleep apnea   Neurologic: Comment: Insomnia  vertigo     "  Cardiovascular: Comment: Incomplete right bundle branch block       (-) hypertension, CHF and orthopnea/PND   METS/Exercise Tolerance:     Hematologic: Comments: Antiphospholipid syndrome - on asprin    (+) History of blood clots (right subclavian DVT thought to secondary to thoracic outlet syndrome,  was also on hormones at the time ) -      Musculoskeletal: Comment: osteopenia  (+) arthritis,  -       GI/Hepatic:     (+) Other GI/Hepatic (polyps)      (-) GERD   Renal/Genitourinary:         Endo:     (+) thyroid problem hypothyroidism, .      Psychiatric:         Infectious Disease:         Malignancy:         Other: Comment: endometriosis                         Physical Exam  Normal systems: cardiovascular, pulmonary and dental    Airway   Mallampati: II  TM distance: >3 FB  Neck ROM: full    Dental     Cardiovascular   Rhythm and rate: regular and normal      Pulmonary    breath sounds clear to auscultation            Lab Results   Component Value Date    WBC 7.4 05/13/2010    HGB 13.4 05/13/2010    HCT 40.0 05/13/2010     05/13/2010    SED 7 05/13/2010     11/20/2019    POTASSIUM 4.0 11/20/2019    CHLORIDE 108 11/20/2019    CO2 26 11/20/2019    BUN 17 11/20/2019    CR 0.96 11/20/2019    GLC 88 11/20/2019    LEON 9.2 11/20/2019    ALBUMIN 5.0 05/13/2010    PROTTOTAL 7.8 05/13/2010    ALT 15 05/13/2010    AST 29 05/13/2010    ALKPHOS 86 05/13/2010    BILITOTAL 0.2 05/13/2010       Preop Vitals  BP Readings from Last 3 Encounters:   02/05/20 (!) 125/93   11/20/19 124/84   11/18/19 112/76    Pulse Readings from Last 3 Encounters:   11/20/19 66   11/18/19 72   05/19/10 62      Resp Readings from Last 3 Encounters:   02/05/20 20   11/20/19 16   05/19/10 18    SpO2 Readings from Last 3 Encounters:   02/05/20 98%   11/20/19 98%      Temp Readings from Last 1 Encounters:   02/05/20 35.9  C (96.6  F)    Ht Readings from Last 1 Encounters:   02/05/20 1.778 m (5' 10\")      Wt Readings from Last 1 Encounters: " "  02/05/20 84.6 kg (186 lb 6.4 oz)    Estimated body mass index is 26.75 kg/m  as calculated from the following:    Height as of this encounter: 1.778 m (5' 10\").    Weight as of this encounter: 84.6 kg (186 lb 6.4 oz).       Anesthesia Plan      History & Physical Review  History and physical reviewed and following examination; no interval change.    ASA Status:  2 .    NPO Status:  > 8 hours    Plan for General and ETT with Propofol induction. Maintenance will be TIVA.    PONV prophylaxis:  Ondansetron (or other 5HT-3) and Dexamethasone or Solumedrol  Patient had reaction to scopolamine patch- does not want one    TIVA with propofol infusion      Postoperative Care  Postoperative pain management:  IV analgesics and Oral pain medications.      Consents  Anesthetic plan, risks, benefits and alternatives discussed with:  Patient and Spouse..                 Fede Anthony MD  "

## 2020-06-23 DIAGNOSIS — Z11.59 ENCOUNTER FOR SCREENING FOR OTHER VIRAL DISEASES: Primary | ICD-10-CM

## 2020-07-07 RX ORDER — LORATADINE 10 MG/1
10 TABLET ORAL DAILY
COMMUNITY

## 2020-07-07 RX ORDER — LEVOTHYROXINE SODIUM 75 UG/1
75 TABLET ORAL DAILY
COMMUNITY
End: 2020-10-14

## 2020-07-07 SDOH — HEALTH STABILITY: MENTAL HEALTH: HOW OFTEN DO YOU HAVE A DRINK CONTAINING ALCOHOL?: NEVER

## 2020-07-08 DIAGNOSIS — Z11.59 ENCOUNTER FOR SCREENING FOR OTHER VIRAL DISEASES: ICD-10-CM

## 2020-07-08 PROCEDURE — U0003 INFECTIOUS AGENT DETECTION BY NUCLEIC ACID (DNA OR RNA); SEVERE ACUTE RESPIRATORY SYNDROME CORONAVIRUS 2 (SARS-COV-2) (CORONAVIRUS DISEASE [COVID-19]), AMPLIFIED PROBE TECHNIQUE, MAKING USE OF HIGH THROUGHPUT TECHNOLOGIES AS DESCRIBED BY CMS-2020-01-R: HCPCS | Performed by: COLON & RECTAL SURGERY

## 2020-07-09 LAB
SARS-COV-2 RNA SPEC QL NAA+PROBE: NOT DETECTED
SPECIMEN SOURCE: NORMAL

## 2020-07-10 ENCOUNTER — ANESTHESIA EVENT (OUTPATIENT)
Dept: SURGERY | Facility: CLINIC | Age: 55
End: 2020-07-10
Payer: COMMERCIAL

## 2020-07-10 ENCOUNTER — HOSPITAL ENCOUNTER (OUTPATIENT)
Facility: CLINIC | Age: 55
Discharge: HOME | End: 2020-07-10
Attending: COLON & RECTAL SURGERY | Admitting: COLON & RECTAL SURGERY
Payer: COMMERCIAL

## 2020-07-10 ENCOUNTER — ANESTHESIA (OUTPATIENT)
Dept: SURGERY | Facility: CLINIC | Age: 55
End: 2020-07-10
Payer: COMMERCIAL

## 2020-07-10 VITALS
WEIGHT: 183 LBS | HEIGHT: 70 IN | TEMPERATURE: 98.8 F | BODY MASS INDEX: 26.2 KG/M2 | SYSTOLIC BLOOD PRESSURE: 151 MMHG | OXYGEN SATURATION: 99 % | RESPIRATION RATE: 18 BRPM | DIASTOLIC BLOOD PRESSURE: 93 MMHG

## 2020-07-10 PROCEDURE — 37000009 ZZH ANESTHESIA TECHNICAL FEE, EACH ADDTL 15 MIN: Performed by: COLON & RECTAL SURGERY

## 2020-07-10 PROCEDURE — 27210794 ZZH OR GENERAL SUPPLY STERILE: Performed by: COLON & RECTAL SURGERY

## 2020-07-10 PROCEDURE — 25000128 H RX IP 250 OP 636: Performed by: NURSE ANESTHETIST, CERTIFIED REGISTERED

## 2020-07-10 PROCEDURE — 25000125 ZZHC RX 250: Performed by: COLON & RECTAL SURGERY

## 2020-07-10 PROCEDURE — 25000576 ZZH RX IP 250 OP 636 J0585: Performed by: COLON & RECTAL SURGERY

## 2020-07-10 PROCEDURE — 40000306 ZZH STATISTIC PRE PROC ASSESS II: Performed by: COLON & RECTAL SURGERY

## 2020-07-10 PROCEDURE — 25800025 ZZH RX 258: Performed by: COLON & RECTAL SURGERY

## 2020-07-10 PROCEDURE — 71000027 ZZH RECOVERY PHASE 2 EACH 15 MINS: Performed by: COLON & RECTAL SURGERY

## 2020-07-10 PROCEDURE — 25000125 ZZHC RX 250: Performed by: NURSE ANESTHETIST, CERTIFIED REGISTERED

## 2020-07-10 PROCEDURE — 36000044 ZZH SURGERY LEVEL 1 1ST 30 MIN: Performed by: COLON & RECTAL SURGERY

## 2020-07-10 PROCEDURE — 25800030 ZZH RX IP 258 OP 636: Performed by: ANESTHESIOLOGY

## 2020-07-10 PROCEDURE — 25000125 ZZHC RX 250: Performed by: ANESTHESIOLOGY

## 2020-07-10 PROCEDURE — 37000008 ZZH ANESTHESIA TECHNICAL FEE, 1ST 30 MIN: Performed by: COLON & RECTAL SURGERY

## 2020-07-10 PROCEDURE — 25000132 ZZH RX MED GY IP 250 OP 250 PS 637: Performed by: COLON & RECTAL SURGERY

## 2020-07-10 RX ORDER — DEXAMETHASONE SODIUM PHOSPHATE 4 MG/ML
INJECTION, SOLUTION INTRA-ARTICULAR; INTRALESIONAL; INTRAMUSCULAR; INTRAVENOUS; SOFT TISSUE PRN
Status: DISCONTINUED | OUTPATIENT
Start: 2020-07-10 | End: 2020-07-10

## 2020-07-10 RX ORDER — FENTANYL CITRATE 50 UG/ML
25-50 INJECTION, SOLUTION INTRAMUSCULAR; INTRAVENOUS
Status: DISCONTINUED | OUTPATIENT
Start: 2020-07-10 | End: 2020-07-10 | Stop reason: HOSPADM

## 2020-07-10 RX ORDER — NALOXONE HYDROCHLORIDE 0.4 MG/ML
.1-.4 INJECTION, SOLUTION INTRAMUSCULAR; INTRAVENOUS; SUBCUTANEOUS
Status: DISCONTINUED | OUTPATIENT
Start: 2020-07-10 | End: 2020-07-10 | Stop reason: HOSPADM

## 2020-07-10 RX ORDER — ALBUTEROL SULFATE 0.83 MG/ML
2.5 SOLUTION RESPIRATORY (INHALATION) EVERY 4 HOURS PRN
Status: DISCONTINUED | OUTPATIENT
Start: 2020-07-10 | End: 2020-07-10 | Stop reason: HOSPADM

## 2020-07-10 RX ORDER — SODIUM CHLORIDE, SODIUM LACTATE, POTASSIUM CHLORIDE, CALCIUM CHLORIDE 600; 310; 30; 20 MG/100ML; MG/100ML; MG/100ML; MG/100ML
INJECTION, SOLUTION INTRAVENOUS CONTINUOUS
Status: DISCONTINUED | OUTPATIENT
Start: 2020-07-10 | End: 2020-07-10 | Stop reason: HOSPADM

## 2020-07-10 RX ORDER — ONDANSETRON 2 MG/ML
4 INJECTION INTRAMUSCULAR; INTRAVENOUS EVERY 30 MIN PRN
Status: DISCONTINUED | OUTPATIENT
Start: 2020-07-10 | End: 2020-07-10 | Stop reason: HOSPADM

## 2020-07-10 RX ORDER — ACYCLOVIR 200 MG/1
CAPSULE ORAL PRN
Status: DISCONTINUED | OUTPATIENT
Start: 2020-07-10 | End: 2020-07-10 | Stop reason: HOSPADM

## 2020-07-10 RX ORDER — MEPERIDINE HYDROCHLORIDE 25 MG/ML
12.5 INJECTION INTRAMUSCULAR; INTRAVENOUS; SUBCUTANEOUS
Status: DISCONTINUED | OUTPATIENT
Start: 2020-07-10 | End: 2020-07-10 | Stop reason: HOSPADM

## 2020-07-10 RX ORDER — FENTANYL CITRATE 50 UG/ML
25-50 INJECTION, SOLUTION INTRAMUSCULAR; INTRAVENOUS EVERY 5 MIN PRN
Status: DISCONTINUED | OUTPATIENT
Start: 2020-07-10 | End: 2020-07-10 | Stop reason: HOSPADM

## 2020-07-10 RX ORDER — ACETAMINOPHEN 325 MG/1
975 TABLET ORAL ONCE
Status: COMPLETED | OUTPATIENT
Start: 2020-07-10 | End: 2020-07-10

## 2020-07-10 RX ORDER — PROPOFOL 10 MG/ML
INJECTION, EMULSION INTRAVENOUS PRN
Status: DISCONTINUED | OUTPATIENT
Start: 2020-07-10 | End: 2020-07-10

## 2020-07-10 RX ORDER — ONDANSETRON 2 MG/ML
INJECTION INTRAMUSCULAR; INTRAVENOUS PRN
Status: DISCONTINUED | OUTPATIENT
Start: 2020-07-10 | End: 2020-07-10

## 2020-07-10 RX ORDER — ONDANSETRON 4 MG/1
4 TABLET, ORALLY DISINTEGRATING ORAL EVERY 30 MIN PRN
Status: DISCONTINUED | OUTPATIENT
Start: 2020-07-10 | End: 2020-07-10 | Stop reason: HOSPADM

## 2020-07-10 RX ORDER — PROPOFOL 10 MG/ML
INJECTION, EMULSION INTRAVENOUS CONTINUOUS PRN
Status: DISCONTINUED | OUTPATIENT
Start: 2020-07-10 | End: 2020-07-10

## 2020-07-10 RX ORDER — FENTANYL CITRATE 50 UG/ML
INJECTION, SOLUTION INTRAMUSCULAR; INTRAVENOUS PRN
Status: DISCONTINUED | OUTPATIENT
Start: 2020-07-10 | End: 2020-07-10

## 2020-07-10 RX ORDER — LIDOCAINE 40 MG/G
CREAM TOPICAL
Status: DISCONTINUED | OUTPATIENT
Start: 2020-07-10 | End: 2020-07-10 | Stop reason: HOSPADM

## 2020-07-10 RX ADMIN — SODIUM CHLORIDE, POTASSIUM CHLORIDE, SODIUM LACTATE AND CALCIUM CHLORIDE: 600; 310; 30; 20 INJECTION, SOLUTION INTRAVENOUS at 12:55

## 2020-07-10 RX ADMIN — PROPOFOL 200 MCG/KG/MIN: 10 INJECTION, EMULSION INTRAVENOUS at 13:46

## 2020-07-10 RX ADMIN — FENTANYL CITRATE 50 MCG: 50 INJECTION, SOLUTION INTRAMUSCULAR; INTRAVENOUS at 13:51

## 2020-07-10 RX ADMIN — DEXAMETHASONE SODIUM PHOSPHATE 4 MG: 4 INJECTION, SOLUTION INTRA-ARTICULAR; INTRALESIONAL; INTRAMUSCULAR; INTRAVENOUS; SOFT TISSUE at 13:59

## 2020-07-10 RX ADMIN — ACETAMINOPHEN 975 MG: 325 TABLET, FILM COATED ORAL at 11:55

## 2020-07-10 RX ADMIN — FENTANYL CITRATE 25 MCG: 50 INJECTION, SOLUTION INTRAMUSCULAR; INTRAVENOUS at 13:55

## 2020-07-10 RX ADMIN — PROPOFOL 20 MG: 10 INJECTION, EMULSION INTRAVENOUS at 13:57

## 2020-07-10 RX ADMIN — ONDANSETRON HYDROCHLORIDE 4 MG: 2 INJECTION, SOLUTION INTRAVENOUS at 14:03

## 2020-07-10 RX ADMIN — PROPOFOL 20 MG: 10 INJECTION, EMULSION INTRAVENOUS at 13:59

## 2020-07-10 RX ADMIN — LIDOCAINE HYDROCHLORIDE 15 MG: 10 INJECTION, SOLUTION EPIDURAL; INFILTRATION; INTRACAUDAL; PERINEURAL at 13:45

## 2020-07-10 RX ADMIN — PROPOFOL 30 MG: 10 INJECTION, EMULSION INTRAVENOUS at 13:48

## 2020-07-10 RX ADMIN — FENTANYL CITRATE 25 MCG: 50 INJECTION, SOLUTION INTRAMUSCULAR; INTRAVENOUS at 13:57

## 2020-07-10 RX ADMIN — MIDAZOLAM 2 MG: 1 INJECTION INTRAMUSCULAR; INTRAVENOUS at 13:40

## 2020-07-10 ASSESSMENT — MIFFLIN-ST. JEOR: SCORE: 1510.33

## 2020-07-10 NOTE — ANESTHESIA POSTPROCEDURE EVALUATION
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    Anesthesia Type:  MAC    Note:  Anesthesia Post Evaluation    Patient location during evaluation: Phase 2  Patient participation: Able to fully participate in evaluation  Level of consciousness: awake and alert  Pain management: adequate  Airway patency: patent  Cardiovascular status: acceptable  Respiratory status: acceptable  Hydration status: acceptable  PONV: controlled             Last vitals:  Vitals:    07/10/20 1129 07/10/20 1140 07/10/20 1411   BP:  (!) 130/97 117/77   Resp: 16 16 16   Temp: 96.8  F (36  C) 96.8  F (36  C) 99.3  F (37.4  C)   SpO2: 99% 98%          Electronically Signed By: Diaz Acosta MD  July 10, 2020  2:44 PM

## 2020-07-10 NOTE — ANESTHESIA CARE TRANSFER NOTE
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    Anesthesia Type:   MAC     Note:  Airway :Room Air  Patient transferred to:Phase II  Comments: Patient awake/alert. To Phase2 Recovery on RA ventilating well. VSS. Report given.Handoff Report: Identifed the Patient, Identified the Reponsible Provider, Reviewed the pertinent medical history, Discussed the surgical course, Reviewed Intra-OP anesthesia mangement and issues during anesthesia, Set expectations for post-procedure period and Allowed opportunity for questions and acknowledgement of understanding      Vitals: (Last set prior to Anesthesia Care Transfer)    CRNA VITALS  7/10/2020 1338 - 7/10/2020 1416      7/10/2020             Pulse:  88    SpO2:  100 %                Electronically Signed By: APRIL Hernandez CRNA  July 10, 2020  2:16 PM

## 2020-07-10 NOTE — ANESTHESIA PREPROCEDURE EVALUATION
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 Thoracic outlet syndrome      SECTION       HYSTERECTOMY VAGINAL, BILATERAL SALPINGO-OOPHERECTOMY, COMBINED       LAPAROSCOPY DIAGNOSTIC (GYN)      \"several\"     RESECT FIRST RIB WITH SUBCLAVIAN VEIN PATCH       Anesthesia Evaluation     . Pt has had prior anesthetic. Type: General    History of anesthetic complications   - PONV        ROS/MED HX    ENT/Pulmonary:  - neg pulmonary ROS     Neurologic:  - neg neurologic ROS     Cardiovascular:  - neg cardiovascular ROS      (-) hypertension and syncope   METS/Exercise Tolerance:     Hematologic:     (+) History of blood clots ( none for 20 years) pt is not anticoagulated, Other Hematologic Disorder-antiphospholipid syndrome      Musculoskeletal:   (+) arthritis,  -       GI/Hepatic:  - neg GI/hepatic ROS      (-) GERD   Renal/Genitourinary:         Endo:     (+) thyroid problem hypothyroidism, .      Psychiatric:         Infectious Disease:  - neg infectious disease ROS       Malignancy:      - no malignancy   Other:                          Physical Exam  Normal systems: cardiovascular, pulmonary and dental    Airway   Mallampati: II  TM distance: >3 FB  Neck ROM: full    Dental     Cardiovascular       Pulmonary             No results found for: WBC, HGB, HCT, PLT, CRP, SED, NA, POTASSIUM, CHLORIDE, CO2, BUN, CR, GLC, LEON, PHOS, MAG, ALBUMIN, PROTTOTAL, ALT, AST, " "GGT, ALKPHOS, BILITOTAL, BILIDIRECT, LIPASE, AMYLASE, ANNEMARIE, PTT, INR, FIBR, TSH, T4, T3, HCG, HCGS, CKTOTAL, CKMB, TROPN    Preop Vitals  BP Readings from Last 3 Encounters:   07/10/20 (!) 130/97    Pulse Readings from Last 3 Encounters:   No data found for Pulse      Resp Readings from Last 3 Encounters:   07/10/20 16    SpO2 Readings from Last 3 Encounters:   07/10/20 98%      Temp Readings from Last 1 Encounters:   07/10/20 96.8  F (36  C) (Temporal)    Ht Readings from Last 1 Encounters:   07/10/20 1.778 m (5' 10\")      Wt Readings from Last 1 Encounters:   07/10/20 83 kg (183 lb)    Estimated body mass index is 26.26 kg/m  as calculated from the following:    Height as of this encounter: 1.778 m (5' 10\").    Weight as of this encounter: 83 kg (183 lb).       Anesthesia Plan      History & Physical Review  History and physical reviewed and following examination; no interval change.    ASA Status:  2 .    NPO Status:  > 8 hours    Plan for MAC with Intravenous induction. Maintenance will be TIVA.  Reason for MAC:  Deep or markedly invasive procedure (G8)  PONV prophylaxis:  Ondansetron (or other 5HT-3) and Dexamethasone or Solumedrol         Postoperative Care  Postoperative pain management:  IV analgesics.      Consents  Anesthetic plan, risks, benefits and alternatives discussed with:  Patient..                 Diaz Acosta MD                    .  "

## 2020-07-10 NOTE — OP NOTE
DATE OF SURGERY: 7/10/2020    PREOPERATIVE DIAGNOSIS   Chronic anal fissure.     POSTOPERATIVE DIAGNOSIS   Chronic anal fissure.     OPERATION PERFORMED   1. Examination under anesthesia.   2. Anal Botox injection.    SURGEON: Rosemarie Henriquez MD    ANESTHESIA: MAC.     INDICATION: Adela Dinero is a 55 year old female with a history of anal pain and intermittent bright red blood per rectum when she has bowel movements. The pain lasts for several hours after having a bowel movement. She has a known fissure and has been treated with Botox injection in the past.  She has failed ongoing conservative management.  The patient will now undergo exam under anesthesia with possible Botox injection.     OPERATIVE FINDINGS: The patient has a chronic-appearing anal fissure in the posterior midline measuring 4 to 5 mm in length with exposure of internal sphincter fibers. This fissure appears slightly smaller than at her last surgery.  She continues to have strong sphincter tone on exam.  Anorectal mucosa is normal.      PROCEDURE IN DETAIL: After informed consent was obtained, the patient was brought to the operating room. The patient was then placed in the lithotomy position. Sedative medications were administered by the anesthesia service. The perianal region was sterilely prepped and draped in standard fashion. A deep, chronic-appearing anal fissure was present in the posterior midline with exposed internal sphincter fibers. A 100 unit vile of Botox was diluted with 2 mL of injectable saline.  Divided doses of 0.5 mL (25 units) was injected into the intersphincteric groove using a 25-gauge needle in the right lateral, left lateral, anterior and posterior positions. Therefore, the entire 100 unit vile was used.      1% Lidocaine with epinephrine mixed with 0.5% Marcaine totaling 40cc, was injected into the perianal region for prolonged postoperative analgesia. The patient tolerated this procedure well without complications.  Estimated blood loss was 2 mL.  The patient was returned to the supine position, and brought to same day surgery in stable condition.     Rosemarie Henriquez MD

## 2020-09-30 DIAGNOSIS — Z11.59 ENCOUNTER FOR SCREENING FOR OTHER VIRAL DISEASES: Primary | ICD-10-CM

## 2020-10-10 DIAGNOSIS — Z11.59 ENCOUNTER FOR SCREENING FOR OTHER VIRAL DISEASES: ICD-10-CM

## 2020-10-10 PROCEDURE — U0003 INFECTIOUS AGENT DETECTION BY NUCLEIC ACID (DNA OR RNA); SEVERE ACUTE RESPIRATORY SYNDROME CORONAVIRUS 2 (SARS-COV-2) (CORONAVIRUS DISEASE [COVID-19]), AMPLIFIED PROBE TECHNIQUE, MAKING USE OF HIGH THROUGHPUT TECHNOLOGIES AS DESCRIBED BY CMS-2020-01-R: HCPCS | Performed by: COLON & RECTAL SURGERY

## 2020-10-11 LAB
SARS-COV-2 RNA SPEC QL NAA+PROBE: NOT DETECTED
SPECIMEN SOURCE: NORMAL

## 2020-10-14 ENCOUNTER — HOSPITAL ENCOUNTER (OUTPATIENT)
Facility: CLINIC | Age: 55
Discharge: HOME | End: 2020-10-14
Attending: COLON & RECTAL SURGERY | Admitting: COLON & RECTAL SURGERY
Payer: COMMERCIAL

## 2020-10-14 ENCOUNTER — ANESTHESIA EVENT (OUTPATIENT)
Dept: SURGERY | Facility: CLINIC | Age: 55
End: 2020-10-14
Payer: COMMERCIAL

## 2020-10-14 ENCOUNTER — ANESTHESIA (OUTPATIENT)
Dept: SURGERY | Facility: CLINIC | Age: 55
End: 2020-10-14
Payer: COMMERCIAL

## 2020-10-14 VITALS
TEMPERATURE: 97.4 F | SYSTOLIC BLOOD PRESSURE: 138 MMHG | OXYGEN SATURATION: 98 % | BODY MASS INDEX: 27.04 KG/M2 | HEIGHT: 70 IN | HEART RATE: 65 BPM | DIASTOLIC BLOOD PRESSURE: 88 MMHG | RESPIRATION RATE: 15 BRPM | WEIGHT: 188.9 LBS

## 2020-10-14 DIAGNOSIS — K60.2 ANAL FISSURE: Primary | ICD-10-CM

## 2020-10-14 PROCEDURE — 250N000009 HC RX 250: Performed by: COLON & RECTAL SURGERY

## 2020-10-14 PROCEDURE — 258N000003 HC RX IP 258 OP 636: Performed by: NURSE ANESTHETIST, CERTIFIED REGISTERED

## 2020-10-14 PROCEDURE — 250N000011 HC RX IP 250 OP 636: Performed by: NURSE ANESTHETIST, CERTIFIED REGISTERED

## 2020-10-14 PROCEDURE — 761N000001 HC RECOVERY PHASE 1 LEVEL 1 FIRST HR: Performed by: COLON & RECTAL SURGERY

## 2020-10-14 PROCEDURE — 360N000021 HC SURGERY LEVEL 3 EA 15 ADDTL MIN: Performed by: COLON & RECTAL SURGERY

## 2020-10-14 PROCEDURE — 370N000001 HC ANESTHESIA TECHNICAL FEE, 1ST 30 MIN: Performed by: COLON & RECTAL SURGERY

## 2020-10-14 PROCEDURE — 272N000001 HC OR GENERAL SUPPLY STERILE: Performed by: COLON & RECTAL SURGERY

## 2020-10-14 PROCEDURE — 250N000009 HC RX 250: Performed by: NURSE ANESTHETIST, CERTIFIED REGISTERED

## 2020-10-14 PROCEDURE — 370N000002 HC ANESTHESIA TECHNICAL FEE, EACH ADDTL 15 MIN: Performed by: COLON & RECTAL SURGERY

## 2020-10-14 PROCEDURE — 250N000013 HC RX MED GY IP 250 OP 250 PS 637: Mod: GY | Performed by: COLON & RECTAL SURGERY

## 2020-10-14 PROCEDURE — 999N000138 HC STATISTIC PRE-PROCEDURE ASSESSMENT I: Performed by: COLON & RECTAL SURGERY

## 2020-10-14 PROCEDURE — 250N000011 HC RX IP 250 OP 636: Performed by: ANESTHESIOLOGY

## 2020-10-14 PROCEDURE — 360N000020 HC SURGERY LEVEL 3 1ST 30 MIN: Performed by: COLON & RECTAL SURGERY

## 2020-10-14 PROCEDURE — 761N000007 HC RECOVERY PHASE 2 EACH 15 MINS: Performed by: COLON & RECTAL SURGERY

## 2020-10-14 PROCEDURE — 258N000003 HC RX IP 258 OP 636: Performed by: ANESTHESIOLOGY

## 2020-10-14 RX ORDER — FENTANYL CITRATE 50 UG/ML
25-50 INJECTION, SOLUTION INTRAMUSCULAR; INTRAVENOUS
Status: DISCONTINUED | OUTPATIENT
Start: 2020-10-14 | End: 2020-10-14 | Stop reason: HOSPADM

## 2020-10-14 RX ORDER — LIDOCAINE HYDROCHLORIDE 20 MG/ML
INJECTION, SOLUTION INFILTRATION; PERINEURAL PRN
Status: DISCONTINUED | OUTPATIENT
Start: 2020-10-14 | End: 2020-10-14

## 2020-10-14 RX ORDER — BUPIVACAINE HYDROCHLORIDE 5 MG/ML
INJECTION, SOLUTION PERINEURAL PRN
Status: DISCONTINUED | OUTPATIENT
Start: 2020-10-14 | End: 2020-10-14 | Stop reason: HOSPADM

## 2020-10-14 RX ORDER — SODIUM CHLORIDE, SODIUM LACTATE, POTASSIUM CHLORIDE, CALCIUM CHLORIDE 600; 310; 30; 20 MG/100ML; MG/100ML; MG/100ML; MG/100ML
INJECTION, SOLUTION INTRAVENOUS CONTINUOUS
Status: DISCONTINUED | OUTPATIENT
Start: 2020-10-14 | End: 2020-10-14 | Stop reason: HOSPADM

## 2020-10-14 RX ORDER — NALOXONE HYDROCHLORIDE 0.4 MG/ML
.1-.4 INJECTION, SOLUTION INTRAMUSCULAR; INTRAVENOUS; SUBCUTANEOUS
Status: DISCONTINUED | OUTPATIENT
Start: 2020-10-14 | End: 2020-10-14 | Stop reason: HOSPADM

## 2020-10-14 RX ORDER — OXYCODONE HYDROCHLORIDE 5 MG/1
5 TABLET ORAL
Status: DISCONTINUED | OUTPATIENT
Start: 2020-10-14 | End: 2020-10-14 | Stop reason: HOSPADM

## 2020-10-14 RX ORDER — ACETAMINOPHEN 325 MG/1
975 TABLET ORAL ONCE
Status: COMPLETED | OUTPATIENT
Start: 2020-10-14 | End: 2020-10-14

## 2020-10-14 RX ORDER — OXYCODONE HYDROCHLORIDE 5 MG/1
5-10 TABLET ORAL EVERY 4 HOURS PRN
Qty: 12 TABLET | Refills: 0 | Status: SHIPPED | OUTPATIENT
Start: 2020-10-14 | End: 2022-05-06

## 2020-10-14 RX ORDER — DEXAMETHASONE SODIUM PHOSPHATE 4 MG/ML
INJECTION, SOLUTION INTRA-ARTICULAR; INTRALESIONAL; INTRAMUSCULAR; INTRAVENOUS; SOFT TISSUE PRN
Status: DISCONTINUED | OUTPATIENT
Start: 2020-10-14 | End: 2020-10-14

## 2020-10-14 RX ORDER — ONDANSETRON 4 MG/1
4 TABLET, ORALLY DISINTEGRATING ORAL EVERY 30 MIN PRN
Status: DISCONTINUED | OUTPATIENT
Start: 2020-10-14 | End: 2020-10-14 | Stop reason: HOSPADM

## 2020-10-14 RX ORDER — ONDANSETRON 2 MG/ML
4 INJECTION INTRAMUSCULAR; INTRAVENOUS EVERY 30 MIN PRN
Status: DISCONTINUED | OUTPATIENT
Start: 2020-10-14 | End: 2020-10-14 | Stop reason: HOSPADM

## 2020-10-14 RX ORDER — PROPOFOL 10 MG/ML
INJECTION, EMULSION INTRAVENOUS CONTINUOUS PRN
Status: DISCONTINUED | OUTPATIENT
Start: 2020-10-14 | End: 2020-10-14

## 2020-10-14 RX ORDER — ONDANSETRON 2 MG/ML
INJECTION INTRAMUSCULAR; INTRAVENOUS PRN
Status: DISCONTINUED | OUTPATIENT
Start: 2020-10-14 | End: 2020-10-14

## 2020-10-14 RX ORDER — LIDOCAINE HYDROCHLORIDE 20 MG/ML
JELLY TOPICAL PRN
Qty: 30 ML | Refills: 1 | Status: SHIPPED | OUTPATIENT
Start: 2020-10-14 | End: 2022-05-06

## 2020-10-14 RX ORDER — HYDROMORPHONE HYDROCHLORIDE 1 MG/ML
.3-.5 INJECTION, SOLUTION INTRAMUSCULAR; INTRAVENOUS; SUBCUTANEOUS EVERY 5 MIN PRN
Status: DISCONTINUED | OUTPATIENT
Start: 2020-10-14 | End: 2020-10-14 | Stop reason: HOSPADM

## 2020-10-14 RX ORDER — ALUMINUM ZIRCONIUM OCTACHLOROHYDREX GLY 16 G/100G
1 GEL TOPICAL DAILY
COMMUNITY

## 2020-10-14 RX ADMIN — ONDANSETRON 4 MG: 2 INJECTION INTRAMUSCULAR; INTRAVENOUS at 09:51

## 2020-10-14 RX ADMIN — FENTANYL CITRATE 25 MCG: 0.05 INJECTION, SOLUTION INTRAMUSCULAR; INTRAVENOUS at 10:55

## 2020-10-14 RX ADMIN — PROPOFOL 300 MCG/KG/MIN: 10 INJECTION, EMULSION INTRAVENOUS at 09:46

## 2020-10-14 RX ADMIN — LIDOCAINE HYDROCHLORIDE 20 MG: 20 INJECTION, SOLUTION INFILTRATION; PERINEURAL at 09:46

## 2020-10-14 RX ADMIN — DEXAMETHASONE SODIUM PHOSPHATE 4 MG: 4 INJECTION, SOLUTION INTRA-ARTICULAR; INTRALESIONAL; INTRAMUSCULAR; INTRAVENOUS; SOFT TISSUE at 09:49

## 2020-10-14 RX ADMIN — ACETAMINOPHEN 975 MG: 325 TABLET, FILM COATED ORAL at 08:53

## 2020-10-14 RX ADMIN — DEXMEDETOMIDINE HYDROCHLORIDE 8 MCG: 100 INJECTION, SOLUTION INTRAVENOUS at 09:58

## 2020-10-14 RX ADMIN — SODIUM CHLORIDE, POTASSIUM CHLORIDE, SODIUM LACTATE AND CALCIUM CHLORIDE: 600; 310; 30; 20 INJECTION, SOLUTION INTRAVENOUS at 09:44

## 2020-10-14 ASSESSMENT — LIFESTYLE VARIABLES: TOBACCO_USE: 0

## 2020-10-14 ASSESSMENT — MIFFLIN-ST. JEOR: SCORE: 1537.09

## 2020-10-14 ASSESSMENT — ENCOUNTER SYMPTOMS: ORTHOPNEA: 0

## 2020-10-14 NOTE — ANESTHESIA PREPROCEDURE EVALUATION
Anesthesia Pre-Procedure Evaluation    Patient: Adela Dinero   MRN: [id number] : 1965          Preoperative Diagnosis: Anal fissure [K60.2]  Anal fistula [K60.3]    Procedure(s):  EXAM UNDER ANESTHESIA  POSSIBLE FISTULOTOMY, POSSIBLE  SETON,  POSSIBLE LATERAL INTERNAL SPHINCTEROTOMY    Past Medical History:   Diagnosis Date     Abdominal cramping     H/O     Acute maxillary sinusitis      Anal fissure      Antiphospholipid syndrome (H)      Arrhythmia      Arthritis      Blood clot in vein     subclavian vein     Chest pain      Colon adenoma     H/O     Complication of anesthesia      Dizziness     POSITIONAL VERTIGO     Endometriosis      Environmental allergies      Hemorrhoids, external      History of renal insufficiency     Mild     Hx of constipation      Hx of pleurisy 10/2013     Hypothyroid      Idiopathic hypercalcemia      Incomplete right bundle branch block      Infected epithelial inclusion cyst      Insomnia      Mass of right axilla      Motion sickness      Osteoarthritis     hands     Osteopenia      Osteoporosis      Pain of right clavicle      Plantar fasciitis     RIGHT     PONV (postoperative nausea and vomiting)      Right shoulder pain      Seborrheic keratosis      Thoracic outlet syndrome      Thyroid nodule      Past Surgical History:   Procedure Laterality Date     APPENDECTOMY       APPENDECTOMY       AS REMOVAL OF RIB(S)      x 3... Thoracic outlet syndrome- , ,       SECTION       EXAM UNDER ANESTHESIA ANUS N/A 7/10/2020    Procedure: Exam under anesthesia anus;  Surgeon: Pily Henriquez MD;  Location: [facility] OR     EXAM UNDER ANESTHESIA RECTUM N/A 2019    Procedure: EXAM UNDER ANESTHESIA;  Surgeon: Pily Henriquez MD;  Location: [facility] OR     EXAM UNDER ANESTHESIA RECTUM N/A 2020    Procedure: EXAM UNDER ANESTHESIA;  Surgeon: Pily Henriquez MD;  Location: [facility] OR     FISTULOTOMY RECTUM N/A 2020    Procedure:  FISTULOTOMY;  Surgeon: Pily Henriquez MD;  Location:  OR     GYN SURGERY      LAPAROSCOPIES X 3- 1997, 1998, 2000     HYSTERECTOMY TOTAL ABDOMINAL  1999     HYSTERECTOMY VAGINAL, BILATERAL SALPINGO-OOPHERECTOMY, COMBINED       HYSTERECTOMY, PAP NO LONGER INDICATED       INJECT BOTOX N/A 11/20/2019    Procedure: PERIANAL BOTOX (100 UNITS);  Surgeon: Pily Henriquez MD;  Location:  OR     INJECT BOTOX N/A 7/10/2020    Procedure: 1. Examination under anesthesia.  2. Anal Botox injection.;  Surgeon: Pily Henriquez MD;  Location: [facility] OR     RESECT FIRST RIB WITH SUBCLAVIAN VEIN PATCH       SOFT TISSUE SURGERY  2008    AXILLARY SEBACIOUS CYST REMOVED     THORACIC SURGERY Right 2002    1ST RIB REMOVED     TONSILLECTOMY  2008       Anesthesia Evaluation     . Pt has had prior anesthetic.     History of anesthetic complications   - PONV and motion sickness        ROS/MED HX    ENT/Pulmonary: Comment: Recurrent sinusitis     (-) tobacco use and sleep apnea   Neurologic: Comment: Insomnia  vertigo      Cardiovascular: Comment: Incomplete right bundle branch block       (-) hypertension, CHF and orthopnea/PND   METS/Exercise Tolerance:     Hematologic: Comments: Antiphospholipid syndrome - on asprin    (+) History of blood clots (right subclavian DVT thought to secondary to thoracic outlet syndrome,  was also on hormones at the time ) -      Musculoskeletal: Comment: osteopenia  (+) arthritis,  -       GI/Hepatic:     (+) Other GI/Hepatic (polyps)      (-) GERD   Renal/Genitourinary:         Endo:     (+) thyroid problem hypothyroidism, .      Psychiatric:         Infectious Disease:         Malignancy:         Other: Comment: endometriosis                           Physical Exam  Normal systems: cardiovascular, pulmonary and dental    Airway   Mallampati: II  TM distance: >3 FB  Neck ROM: full    Dental     Cardiovascular   Rhythm and rate: regular and normal      Pulmonary    breath sounds clear to  "auscultation            Lab Results   Component Value Date    WBC 7.4 05/13/2010    HGB 13.4 05/13/2010    HCT 40.0 05/13/2010     05/13/2010    SED 7 05/13/2010     11/20/2019    POTASSIUM 4.0 11/20/2019    CHLORIDE 108 11/20/2019    CO2 26 11/20/2019    BUN 17 11/20/2019    CR 0.96 11/20/2019    GLC 88 11/20/2019    LEON 9.2 11/20/2019    ALBUMIN 5.0 05/13/2010    PROTTOTAL 7.8 05/13/2010    ALT 15 05/13/2010    AST 29 05/13/2010    ALKPHOS 86 05/13/2010    BILITOTAL 0.2 05/13/2010       Preop Vitals  BP Readings from Last 3 Encounters:   10/14/20 (!) 131/91   07/10/20 (!) 151/93   02/05/20 111/85    Pulse Readings from Last 3 Encounters:   10/14/20 (!) 16   02/05/20 (!) 41   11/20/19 66      Resp Readings from Last 3 Encounters:   10/14/20 16   07/10/20 18   02/05/20 16    SpO2 Readings from Last 3 Encounters:   10/14/20 95%   07/10/20 99%   02/05/20 99%      Temp Readings from Last 1 Encounters:   10/14/20 36.6  C (97.9  F) (Temporal)    Ht Readings from Last 1 Encounters:   10/14/20 1.778 m (5' 10\")      Wt Readings from Last 1 Encounters:   10/14/20 85.7 kg (188 lb 14.4 oz)    Estimated body mass index is 27.1 kg/m  as calculated from the following:    Height as of an earlier encounter on 10/14/20: 1.778 m (5' 10\").    Weight as of an earlier encounter on 10/14/20: 85.7 kg (188 lb 14.4 oz).       Anesthesia Plan      History & Physical Review  History and physical reviewed and following examination; no interval change.    ASA Status:  2 .    NPO Status:  > 8 hours    Plan for General with Propofol induction.   PONV prophylaxis:  Ondansetron (or other 5HT-3) and Dexamethasone or Solumedrol  Patient had reaction to scopolamine patch- does not want one            Postoperative Care  Postoperative pain management:  IV analgesics and Oral pain medications.      Consents  Anesthetic plan, risks, benefits and alternatives discussed with:  Patient..                   Alex Schmidt MD  "

## 2020-10-14 NOTE — ANESTHESIA POSTPROCEDURE EVALUATION
Patient: Adela Dinero    Procedure(s):  EXAM UNDER ANESTHESIA, ANUS  LATERAL INTERNAL SPHINCTEROTOMY    Diagnosis:Fissure, anal [K60.2]  Diagnosis Additional Information: No value filed.    Anesthesia Type:  MAC    Note:  Anesthesia Post Evaluation    Patient location during evaluation: PACU  Patient participation: Able to fully participate in evaluation  Level of consciousness: awake and alert  Pain management: adequate  Airway patency: patent  Cardiovascular status: acceptable  Respiratory status: acceptable  Hydration status: acceptable  PONV: none     Anesthetic complications: None          Last vitals:  Vitals:    10/14/20 1100 10/14/20 1117 10/14/20 1152   BP: 117/77 (!) 125/91 138/88   Pulse: 65 65    Resp: 9 12 15   Temp: 36.3  C (97.4  F)     SpO2: 98% 97% 98%         Electronically Signed By: Lawrence Ayers MD  October 14, 2020  2:22 PM

## 2020-10-14 NOTE — ANESTHESIA CARE TRANSFER NOTE
Patient: Adela Dinero    Procedure(s):  EXAM UNDER ANESTHESIA, ANUS  LATERAL INTERNAL SPHINCTEROTOMY    Diagnosis: Fissure, anal [K60.2]  Diagnosis Additional Information: No value filed.    Anesthesia Type:   MAC     Note:  Airway :Room Air  Patient transferred to:PACU  Comments: At end of procedure, spontaneous respirations, patient alert to voice, able to follow commands. Patient breathing room to PACU. Oxygen tubing connected to wall O2 in PACU, SpO2, NiBP, and EKG monitors and alarms on and functioning, Garry Hugger warmer connected to patient gown, report on patient's clinical status given to PACU RN, RN questions answered.Handoff Report: Identifed the Patient, Identified the Reponsible Provider, Reviewed the pertinent medical history, Discussed the surgical course, Reviewed Intra-OP anesthesia mangement and issues during anesthesia, Set expectations for post-procedure period and Allowed opportunity for questions and acknowledgement of understanding      Vitals: (Last set prior to Anesthesia Care Transfer)    CRNA VITALS  10/14/2020 1006 - 10/14/2020 1041      10/14/2020             NIBP:  112/68    NIBP Mean:  76                Electronically Signed By: APRIL Blair CRNA  October 14, 2020  10:41 AM

## 2020-10-14 NOTE — OP NOTE
OPERATIVE REPORT      PREOPERATIVE DIAGNOSIS:  Chronic anal fissure.     POSTOPERATIVE DIAGNOSIS:  Chronic anal fissure.     OPERATION PERFORMED:    1. Exam under anesthesia  2. Lateral internal sphincterotomy on the right.     SURGEON:  Rosemarie Henriquez M.D.     ANESTHESIA:  MAC.     INDICATIONS: Adela Dinero is a 55 year old female who was initially found to have a fissure in the posterior midline. At that time, medical management with MiraLax and diltiazem ointment was initiated for treatment for this anal fissure. Since starting both of these she has been able to have soft daily bowel movements, however, has continued to have bleeding and pain. We have done Botox injection twice now and her fissure and symptoms have persisted.  The fissure appears chronic on exam. At this point,medial treatment has been maximized for this anal fissure, thus I have recommended proceeding with surgical treatment. The patient will now undergo a lateral internal sphincterotomy.    OPERATIVE FINDINGS:  The patient has a chronic anal fissure in the posterior midline.  This fissure measures approximately 10 mm in length and there are sphincter fibers exposed at the base.  There are moderate size hemorrhoids present circumferentially.    PROCEDURE IN DETAIL:  After informed consent was obtained, the patient was brought to the operating room and flipped into the well-padded prone jackknife position with the buttocks taped apart.  Her perianal region was sterilely prepped and draped in usual fashion.  Sedative medications were administered by the Anesthesia Service.  An anal field block consisting of 25 mL of 1% Lidocaine with epinephrine and 10 mL of 0.5% Marcaine was instilled into the 4 quadrants of the anal canal.     A Pendleton bivalve retractor was inserted into the anal canal, and the operative findings are noted above.  A tight band of internal sphincter muscle could easily be palpated in the right lateral position.  She had large  internal hemorrhoids in the left lateral position with a larger external component on the left so I chose to do the sphincterotomy on the right.  A 1 cm incision was made in the perianal skin overlying the intersphincteric space using a 15 blade scalpel.  The intersphincteric groove was identified and the internal sphincter was dissected away from the anoderm and anal mucosa.  The distal internal sphincter was divided using electrocautery for a length of 5 mm to just about the dentate line/the length of the posterior fissure until the Pendleton bivalve gave a little.  Less than 5% of the sphincter complex was divided.  Hemostasis was achieved with electrocautery.  The incision was closed with a running 3-0 chromic suture. Hemostasis was adequate.     Sterile guaze dressing was placed.The patient tolerated the procedure well without complications.  Estimated blood loss was minimal.  I was present and scrubbed for the entire duration of the operation.  The patient was returned to the supine position and brought to Same Day Surgery in stable condition.    Rosemarie Henriquez MD

## 2020-10-14 NOTE — DISCHARGE INSTRUCTIONS
Same Day Surgery Discharge Instructions for  Sedation and General Anesthesia       It's not unusual to feel dizzy, light-headed or faint for up to 24 hours after surgery or while taking pain medication.  If you have these symptoms: sit for a few minutes before standing and have someone assist you when you get up to walk or use the bathroom.      You should rest and relax for the next 24 hours. We recommend you make arrangements to have an adult stay with you for at least 24 hours after your discharge.  Avoid hazardous and strenuous activity.      DO NOT DRIVE any vehicle or operate mechanical equipment for 24 hours following the end of your surgery.  Even though you may feel normal, your reactions may be affected by the medication you have received.      Do not drink alcoholic beverages for 24 hours following surgery.       Slowly progress to your regular diet as you feel able. It's not unusual to feel nauseated and/or vomit after receiving anesthesia.  If you develop these symptoms, drink clear liquids (apple juice, ginger ale, broth, 7-up, etc. ) until you feel better.  If your nausea and vomiting persists for 24 hours, please notify your surgeon.        All narcotic pain medications, along with inactivity and anesthesia, can cause constipation. Drinking plenty of liquids and increasing fiber intake will help.      For any questions of a medical nature, call your surgeon.      Do not make important decisions for 24 hours.      If you had general anesthesia, you may have a sore throat for a couple of days related to the breathing tube used during surgery.  You may use Cepacol lozenges to help with this discomfort.  If it worsens or if you develop a fever, contact your surgeon.       If you feel your pain is not well managed with the pain medications prescribed by your surgeon, please contact your surgeon's office to let them know so they can address your concerns.       CoVid 19 Information    We want to give you  information regarding Covid. Please consult your primary care provider with any questions you might have.     Patient who have symptoms (cough, fever, or shortness of breath), need to isolate for 7 days from when symptoms started OR 72 hours after fever resolves (without fever reducing medications) AND improvement of respiratory symptoms (whichever is longer).      Isolate yourself at home (in own room/own bathroom if possible)    Do Not allow any visitors    Do Not go to work or school    Do Not go to [employer],  centers, shopping, or other public places.    Do Not shake hands.    Avoid close and intimate contact with others (hugging, kissing).    Follow CDC recommendations for household cleaning of frequently touched services.     After the initial 7 days, continue to isolate yourself from household members as much as possible. To continue decrease the risk of community spread and exposure, you and any members of your household should limit activities in public for 14 days after starting home isolation.     You can reference the following CDC link for helpful home isolation/care tips:  https://www.cdc.gov/coronavirus/2019-ncov/downloads/10Things.pdf    Protect Others:    Cover Your Mouth and Nose with a mask, disposable tissue or wash cloth to avoid spreading germs to others.    Wash your hands and face frequently with soap and water    Call Your Primary Doctor If: Breathing difficulty develops or you become worse.    For more information about COVID19 and options for caring for yourself at home, please visit the CDC website at https://www.cdc.gov/coronavirus/2019-ncov/about/steps-when-sick.html  For more options for care at [facility], please visit our website at [email or web address]          Discharge Instructions Following Anorectal Surgery  [facility]  [phone]  Medication:       You may be prescribed a narcotic pain relievers such as:  Vicodin, Percocet, and Tylenol # 3.  You should reduce your use of these medications as your pain improves.    You may be prescribed an anal ointment (such as Americain, Tronothane, or Xylocaine). Apply the ointment to the anal area with your finger, then cover the area with cotton or gauze.    Stool softeners (Miralax) are to be taken in a glass of water once in the morning with increased water intake throughout the day.   Bowel function:   It is important to have soft bowel movements at least every other day and to keep your stool soft. Constipation and/or diarrhea can make the pain worse and must be avoided.   Constipation:  You should have a bowel movement at least every other day.  If two days pass without one, take one tablespoon of milk of magnesia; if there is no result, repeat this dose in six hours.   Diarrhea:  Diarrhea, usually caused by the overuse of laxatives, is also a concern. If you have more than three watery bowel movements during a 24 hour period, stop taking milk of magnesia or laxatives.  If diarrhea persists, call your doctor.   Bathing:  After bowel movements, use a wet washcloth, toilet paper or cotton to clean yourself.  If possible take a sitz bath or tub bath immediately. Baths should last between 10-15 minutes with the water as warm as you can comfortably tolerate. Try to take at least three sitz baths (or showers with hand-held sprayer) every day.   Discharge/Infection:  Bloody discharge after bowel movements is normal and may last 2 to 4 weeks after your surgery. However, if you have bleeding between bowel movements that doesn't stop, call the office.  Urination:  If you have trouble urinating, do so while sitting in a tub of water, or run the water faucet while sitting on the toilet. If you are unable to urinate 6-8 hours after surgery, call the office.  Diet:  A high fiber diet, including at least four servings of fruits or vegetables daily, will help to keep your bowel movements  regular and soft. It is important to drink six to eight glasses of water or juice everyday when using fiber products.   Activity:    Activity as tolerated. After some surgeries, you may be told not to perform any lifting (more than 10 pounds) for several weeks after surgery.    Call the office if you have:    Fever greater than 101     Chills     Foul-smelling drainage     Nausea and vomiting     Diarrhea - greater than 3 water stools in 24 hours     Constipation - no bowel movement for 3 days     Severe bleeding that does not stop soon after a bowel movement     Problems with the incision, including increased pain, swelling, redness, or drainage.    Difficulty urinating      **If you have questions or concerns about your procedure,    call Dr. Henriquez at [phone]**

## 2021-01-15 ENCOUNTER — HEALTH MAINTENANCE LETTER (OUTPATIENT)
Age: 56
End: 2021-01-15

## 2021-02-13 ENCOUNTER — HEALTH MAINTENANCE LETTER (OUTPATIENT)
Age: 56
End: 2021-02-13

## 2021-09-19 ENCOUNTER — HEALTH MAINTENANCE LETTER (OUTPATIENT)
Age: 56
End: 2021-09-19

## 2022-03-05 ENCOUNTER — HEALTH MAINTENANCE LETTER (OUTPATIENT)
Age: 57
End: 2022-03-05

## 2022-05-05 NOTE — PROGRESS NOTES
Adela is a 57 year old  female who presents for annual exam.     Besides routine health maintenance, she has no other health concerns today .    HPI:  The patient's PCP is Fei Roldan MD. patient here today for her annual GYN exam and mammogram.  She is status post ALEX/BSO secondary to endometriosis.  Her last Pap smear was in 2019 and we will complete today.  She follows with endocrinology for her DEXA scan and she is currently not being treated.  She has used 2 oral components in the past.    She struggles with dyspareunia and is not on hormone replacement therapy.  She does have a history of clotting and is not a candidate for systemic estrogen therapy.  She has minimal hot flashes and night sweats that are not debilitating.    She does have some upper right quadrant discomfort specifically with bowel movements.  She does suffer from constipation but with her history of endometriosis and GI involvement she is curious if this is part of her endometriosis.      GYNECOLOGIC HISTORY:    No LMP recorded. Patient has had a hysterectomy.    Her current contraception method is: hysterectomy.  She  reports that she has never smoked. She has never used smokeless tobacco.    Patient is sexually active.  STD testing offered?  Declined  Last PHQ-9 score on record =   PHQ-9 SCORE 2022   PHQ-9 Total Score 1     Last GAD7 score on record =   CAYLA-7 SCORE 2022   Total Score 0     Alcohol Score = 0    HEALTH MAINTENANCE:  Cholesterol: (No results found for: CHOL  Last Mammo: 2019, Result: Normal, Next Mammo: Today  Pap:   Lab Results   Component Value Date    PAP NIL, HPV- 2019      Colonoscopy: Per patient approx 3.5 years ago, Result: Polyps, Next Colonoscopy: 1.5 years.  Dexa:  N/a    Health maintenance updated:  yes    HISTORY:  OB History    Para Term  AB Living   1 1 0 1 0 0   SAB IAB Ectopic Multiple Live Births   0 0 0 0 0      # Outcome Date GA Lbr Jarrod/2nd Weight Sex Delivery Anes  PTL Lv   1                 Patient Active Problem List   Diagnosis     Blood disorder     Past Surgical History:   Procedure Laterality Date     APPENDECTOMY       APPENDECTOMY       AS REMOVAL OF RIB(S)      x 3... Thoracic outlet syndrome- , ,       SECTION       EXAM UNDER ANESTHESIA ANUS N/A 7/10/2020    Procedure: Exam under anesthesia anus;  Surgeon: Pily Henriquez MD;  Location:  OR     EXAM UNDER ANESTHESIA ANUS N/A 10/14/2020    Procedure: EXAM UNDER ANESTHESIA, ANUS;  Surgeon: Pily Henriquez MD;  Location:  OR     EXAM UNDER ANESTHESIA RECTUM N/A 2019    Procedure: EXAM UNDER ANESTHESIA;  Surgeon: Pily Henriquez MD;  Location:  OR     EXAM UNDER ANESTHESIA RECTUM N/A 2020    Procedure: EXAM UNDER ANESTHESIA;  Surgeon: Pily Henriquez MD;  Location:  OR     FISTULOTOMY RECTUM N/A 2020    Procedure: FISTULOTOMY;  Surgeon: Pily Henriquez MD;  Location:  OR     GYN SURGERY      LAPAROSCOPIES X 3- , ,      HYSTERECTOMY TOTAL ABDOMINAL       HYSTERECTOMY VAGINAL, BILATERAL SALPINGO-OOPHERECTOMY, COMBINED       HYSTERECTOMY, PAP NO LONGER INDICATED       INJECT BOTOX N/A 2019    Procedure: PERIANAL BOTOX (100 UNITS);  Surgeon: Pily Henriquez MD;  Location:  OR     INJECT BOTOX N/A 7/10/2020    Procedure: 1. Examination under anesthesia.  2. Anal Botox injection.;  Surgeon: Pily Henriquez MD;  Location: [facility] OR     RESECT FIRST RIB WITH SUBCLAVIAN VEIN PATCH       SOFT TISSUE SURGERY      AXILLARY SEBACIOUS CYST REMOVED     SPHINCTEROTOMY RECTUM N/A 10/14/2020    Procedure: LATERAL INTERNAL SPHINCTEROTOMY;  Surgeon: Pily Henriquez MD;  Location:  OR     THORACIC SURGERY Right 2002    1ST RIB REMOVED     TONSILLECTOMY        Social History     Tobacco Use     Smoking status: Never Smoker     Smokeless tobacco: Never Used   Substance Use Topics     Alcohol use: Not Currently  "     Problem (# of Occurrences) Relation (Name,Age of Onset)    Diabetes (1) Maternal Grandmother    Heart Disease (1) Maternal Grandfather    Hypertension (1) Father: mild            Current Outpatient Medications   Medication Sig     amitriptyline (ELAVIL) 25 MG tablet Take 25 mg by mouth At Bedtime     aspirin (ASA) 325 MG tablet Take 1 tablet by mouth every 24 hours     estradiol (ESTRACE) 0.1 MG/GM vaginal cream Place 1 g vaginally At Bedtime For 30 nights, then three times per week thereafter. Use finger for application.     hydrochlorothiazide (HYDRODIURIL) 25 MG tablet 25 mg     levothyroxine (SYNTHROID/LEVOTHROID) 75 MCG tablet Take 75 mcg by mouth daily     loratadine (CLARITIN) 10 MG tablet Take 10 mg by mouth daily     LORazepam (ATIVAN) 0.5 MG tablet Take 0.5 mg by mouth as needed      Multiple Minerals-Vitamins (CITRACAL PLUS PO)      polyethylene glycol (MIRALAX/GLYCOLAX) packet Take 1 packet by mouth daily     Probiotic Product (ALIGN) 4 MG CAPS Take 1 capsule by mouth daily     VITAMIN D PO Take 2,000 Units by mouth     No current facility-administered medications for this visit.     Allergies   Allergen Reactions     Codeine GI Disturbance     Sulfa Drugs Rash     Codeine Nausea     Shellfish-Derived Products      Chest tightness     Sulfa Drugs Rash       Past medical, surgical, social and family histories were reviewed and updated in EPIC.    ROS:   12 point review of systems negative other than symptoms noted below or in the HPI.  No urinary frequency or dysuria, bladder or kidney problems, dysparunia    EXAM:  /64   Pulse 82   Ht 1.791 m (5' 10.5\")   Wt 88.9 kg (196 lb)   Breastfeeding No   BMI 27.73 kg/m     BMI: Body mass index is 27.73 kg/m .    PHYSICAL EXAM:  Constitutional:   Appearance: Well nourished, well developed, alert, in no acute distress  Neck:  Lymph Nodes:  No lymphadenopathy present    Thyroid:  Gland size normal, nontender, no nodules or masses present  on " palpation  Chest:  Respiratory Effort:  Breathing unlabored  Cardiovascular:    Heart: Auscultation:  Regular rate, normal rhythm, no murmurs present  Breasts: Inspection of Breasts:  No lymphadenopathy present., Palpation of Breasts and Axillae:  No masses present on palpation, no breast tenderness., Axillary Lymph Nodes:  No lymphadenopathy present. and No nodularity, asymmetry or nipple discharge bilaterally.  Gastrointestinal:   Abdominal Examination:  Abdomen nontender to palpation, tone normal without rigidity or guarding, no masses present, umbilicus without lesions   Liver and Spleen:  No hepatomegaly present, liver nontender to palpation    Hernias:  No hernias present  Lymphatic: Lymph Nodes:  No other lymphadenopathy present  Skin:  General Inspection:  No rashes present, no lesions present, no areas of  discoloration  Neurologic:    Mental Status:  Oriented X3.  Normal strength and tone, sensory exam                grossly normal, mentation intact and speech normal.    Psychiatric:   Mentation appears normal and affect normal/bright.         Pelvic Exam:  External Genitalia:     Normal appearance for age, no discharge present, no tenderness present, no inflammatory lesions present, color normal  Vagina:     Normal vaginal vault without central or paravaginal defects, no discharge present, no inflammatory lesions present, no masses present.  Erythematous and extremely atrophic  Bladder:     Nontender to palpation  Urethra:   Urethral Body:  Urethra palpation normal, urethra structural support normal   Urethral Meatus:  No erythema or lesions present  Cervix:     Surgically absent  Uterus:     Surgically absent  Adnexa:     Surgically absent  Perineum:     Perineum within normal limits, no evidence of trauma, no rashes or skin lesions present  Anus:     Anus within normal limits, no hemorrhoids present  Inguinal Lymph Nodes:     No lymphadenopathy present    COUNSELING:   Special attention given to:         Regular exercise       Healthy diet/nutrition       Osteoporosis prevention/bone health       Colorectal Cancer Screening       (Jannie)menopause management    BMI: Body mass index is 27.73 kg/m .  Weight management plan: Discussed healthy diet and exercise guidelines    ASSESSMENT:  57 year old female with satisfactory annual exam.    ICD-10-CM    1. Encounter for gynecological examination without abnormal finding  Z01.419 Pap thin layer screen with HPV - recommended age 30 - 65 years   2. Vaginal atrophy  N95.2 estradiol (ESTRACE) 0.1 MG/GM vaginal cream       PLAN:  57-year-old female with vaginal atrophy on exam.  We discussed that vaginal estrogen does not have a clotting component that systemic use does.  We will start her with vaginal estrogen cream and encouraged her to use this as directed.  Vaginal Pap smear was collected and if it is normal she can repeat in 3 years.    APRIL Stoner CNP

## 2022-05-06 ENCOUNTER — ANCILLARY PROCEDURE (OUTPATIENT)
Dept: MAMMOGRAPHY | Facility: CLINIC | Age: 57
End: 2022-05-06
Payer: COMMERCIAL

## 2022-05-06 ENCOUNTER — OFFICE VISIT (OUTPATIENT)
Dept: OBGYN | Facility: CLINIC | Age: 57
End: 2022-05-06
Payer: COMMERCIAL

## 2022-05-06 VITALS
BODY MASS INDEX: 27.44 KG/M2 | SYSTOLIC BLOOD PRESSURE: 122 MMHG | DIASTOLIC BLOOD PRESSURE: 64 MMHG | WEIGHT: 196 LBS | HEART RATE: 82 BPM | HEIGHT: 71 IN

## 2022-05-06 DIAGNOSIS — N95.2 VAGINAL ATROPHY: ICD-10-CM

## 2022-05-06 DIAGNOSIS — Z01.419 ENCOUNTER FOR GYNECOLOGICAL EXAMINATION WITHOUT ABNORMAL FINDING: Primary | ICD-10-CM

## 2022-05-06 DIAGNOSIS — Z12.31 VISIT FOR SCREENING MAMMOGRAM: ICD-10-CM

## 2022-05-06 PROCEDURE — 77063 BREAST TOMOSYNTHESIS BI: CPT | Mod: TC | Performed by: RADIOLOGY

## 2022-05-06 PROCEDURE — 99213 OFFICE O/P EST LOW 20 MIN: CPT | Mod: 25 | Performed by: NURSE PRACTITIONER

## 2022-05-06 PROCEDURE — 99396 PREV VISIT EST AGE 40-64: CPT | Performed by: NURSE PRACTITIONER

## 2022-05-06 PROCEDURE — G0101 CA SCREEN;PELVIC/BREAST EXAM: HCPCS | Performed by: NURSE PRACTITIONER

## 2022-05-06 PROCEDURE — 87624 HPV HI-RISK TYP POOLED RSLT: CPT | Performed by: NURSE PRACTITIONER

## 2022-05-06 PROCEDURE — 77067 SCR MAMMO BI INCL CAD: CPT | Mod: TC | Performed by: RADIOLOGY

## 2022-05-06 RX ORDER — HYDROCHLOROTHIAZIDE 25 MG/1
25 TABLET ORAL
COMMUNITY
End: 2024-03-27

## 2022-05-06 RX ORDER — ESTRADIOL 0.1 MG/G
1 CREAM VAGINAL AT BEDTIME
Qty: 42.5 G | Refills: 4 | Status: SHIPPED | OUTPATIENT
Start: 2022-05-06 | End: 2024-03-27

## 2022-05-06 ASSESSMENT — ANXIETY QUESTIONNAIRES
6. BECOMING EASILY ANNOYED OR IRRITABLE: NOT AT ALL
7. FEELING AFRAID AS IF SOMETHING AWFUL MIGHT HAPPEN: NOT AT ALL
2. NOT BEING ABLE TO STOP OR CONTROL WORRYING: NOT AT ALL
GAD7 TOTAL SCORE: 0
5. BEING SO RESTLESS THAT IT IS HARD TO SIT STILL: NOT AT ALL
3. WORRYING TOO MUCH ABOUT DIFFERENT THINGS: NOT AT ALL
1. FEELING NERVOUS, ANXIOUS, OR ON EDGE: NOT AT ALL
IF YOU CHECKED OFF ANY PROBLEMS ON THIS QUESTIONNAIRE, HOW DIFFICULT HAVE THESE PROBLEMS MADE IT FOR YOU TO DO YOUR WORK, TAKE CARE OF THINGS AT HOME, OR GET ALONG WITH OTHER PEOPLE: NOT DIFFICULT AT ALL

## 2022-05-06 ASSESSMENT — PATIENT HEALTH QUESTIONNAIRE - PHQ9
SUM OF ALL RESPONSES TO PHQ QUESTIONS 1-9: 1
5. POOR APPETITE OR OVEREATING: NOT AT ALL

## 2022-05-07 ASSESSMENT — ANXIETY QUESTIONNAIRES: GAD7 TOTAL SCORE: 0

## 2022-05-12 LAB
BKR LAB AP GYN ADEQUACY: NORMAL
BKR LAB AP GYN INTERPRETATION: NORMAL
BKR LAB AP HPV REFLEX: NORMAL
BKR LAB AP PREVIOUS ABNORMAL: NORMAL
PATH REPORT.COMMENTS IMP SPEC: NORMAL
PATH REPORT.COMMENTS IMP SPEC: NORMAL
PATH REPORT.RELEVANT HX SPEC: NORMAL

## 2022-05-16 LAB
HUMAN PAPILLOMA VIRUS 16 DNA: NEGATIVE
HUMAN PAPILLOMA VIRUS 18 DNA: NEGATIVE
HUMAN PAPILLOMA VIRUS FINAL DIAGNOSIS: NORMAL
HUMAN PAPILLOMA VIRUS OTHER HR: NEGATIVE

## 2022-11-20 ENCOUNTER — HEALTH MAINTENANCE LETTER (OUTPATIENT)
Age: 57
End: 2022-11-20

## 2023-05-18 NOTE — ANESTHESIA PREPROCEDURE EVALUATION
Anesthesia Pre-Procedure Evaluation    Patient: Adela Dinero   MRN: [id number] : 1965          Preoperative Diagnosis: Fissure, anal [K60.2]    Procedure(s):  EXAM UNDER ANESTHESIA  PERIANAL BOTOX (100 UNITS)    Past Medical History:   Diagnosis Date     Acute maxillary sinusitis      Arthritis      Chest pain      Dizziness      Endometriosis      Hemorrhoids, external      Idiopathic hypercalcemia      Infected epithelial inclusion cyst 2016     Insomnia      Mass of right axilla      Motion sickness      Osteopenia      Osteoporosis      Pain of right clavicle      Plantar fasciitis      PONV (postoperative nausea and vomiting)      Right shoulder pain      Seborrheic keratosis      Thoracic outlet syndrome      Thyroid disease     hypothroidism     Thyroid nodule      Past Surgical History:   Procedure Laterality Date     APPENDECTOMY        SECTION       HYSTERECTOMY TOTAL ABDOMINAL       LAPAROSCOPY      , ,      rib surgery      , ,      TONSILLECTOMY  2008       Anesthesia Evaluation     . Pt has had prior anesthetic.     History of anesthetic complications   - PONV and motion sickness        ROS/MED HX    ENT/Pulmonary:      (-) tobacco use and sleep apnea   Neurologic:       Cardiovascular:         METS/Exercise Tolerance:     Hematologic:         Musculoskeletal:   (+) arthritis,  -       GI/Hepatic:        (-) GERD   Renal/Genitourinary:         Endo:     (+) thyroid problem hypothyroidism, .      Psychiatric:         Infectious Disease:         Malignancy:         Other:                          Physical Exam  Normal systems: cardiovascular, pulmonary and dental    Airway   Mallampati: II  TM distance: >3 FB  Neck ROM: full    Dental     Cardiovascular       Pulmonary             Lab Results   Component Value Date    WBC 7.4 2010    HGB 13.4 2010    HCT 40.0 2010     2010    SED 7 2010     2019     "POTASSIUM 4.0 11/20/2019    CHLORIDE 108 11/20/2019    CO2 26 11/20/2019    BUN 17 11/20/2019    CR 0.96 11/20/2019    GLC 88 11/20/2019    LEON 9.2 11/20/2019    ALBUMIN 5.0 05/13/2010    PROTTOTAL 7.8 05/13/2010    ALT 15 05/13/2010    AST 29 05/13/2010    ALKPHOS 86 05/13/2010    BILITOTAL 0.2 05/13/2010       Preop Vitals  BP Readings from Last 3 Encounters:   11/20/19 125/88   11/18/19 112/76   05/19/10 108/77    Pulse Readings from Last 3 Encounters:   11/18/19 72   05/19/10 62   05/13/10 76      Resp Readings from Last 3 Encounters:   11/20/19 16   05/19/10 18   05/13/10 18    SpO2 Readings from Last 3 Encounters:   11/20/19 100%      Temp Readings from Last 1 Encounters:   11/20/19 36.8  C (98.2  F) (Temporal)    Ht Readings from Last 1 Encounters:   11/20/19 1.778 m (5' 10\")      Wt Readings from Last 1 Encounters:   11/20/19 84.4 kg (186 lb 1.6 oz)    Estimated body mass index is 26.7 kg/m  as calculated from the following:    Height as of this encounter: 1.778 m (5' 10\").    Weight as of this encounter: 84.4 kg (186 lb 1.6 oz).       Anesthesia Plan      History & Physical Review  History and physical reviewed and following examination; no interval change.    ASA Status:  2 .    NPO Status:  > 8 hours    Plan for MAC   PONV prophylaxis:  Ondansetron (or other 5HT-3)       Postoperative Care  Postoperative pain management:  IV analgesics and Oral pain medications.      Consents  Anesthetic plan, risks, benefits and alternatives discussed with:  Patient and Spouse..                 Tico Tapia MD  " 10

## 2023-07-08 ENCOUNTER — HEALTH MAINTENANCE LETTER (OUTPATIENT)
Age: 58
End: 2023-07-08

## 2024-03-27 ENCOUNTER — OFFICE VISIT (OUTPATIENT)
Dept: OBGYN | Facility: CLINIC | Age: 59
End: 2024-03-27
Payer: MEDICARE

## 2024-03-27 VITALS
DIASTOLIC BLOOD PRESSURE: 72 MMHG | SYSTOLIC BLOOD PRESSURE: 126 MMHG | HEIGHT: 72 IN | BODY MASS INDEX: 26.98 KG/M2 | WEIGHT: 199.2 LBS

## 2024-03-27 DIAGNOSIS — Z01.419 ENCOUNTER FOR GYNECOLOGICAL EXAMINATION WITHOUT ABNORMAL FINDING: Primary | ICD-10-CM

## 2024-03-27 DIAGNOSIS — N95.2 VAGINAL ATROPHY: ICD-10-CM

## 2024-03-27 PROCEDURE — 99459 PELVIC EXAMINATION: CPT | Performed by: NURSE PRACTITIONER

## 2024-03-27 PROCEDURE — 99207 PR ANNUAL WELLNESS VISIT, PPS, SUBSEQUENT STAT: CPT | Performed by: NURSE PRACTITIONER

## 2024-03-27 PROCEDURE — G0101 CA SCREEN;PELVIC/BREAST EXAM: HCPCS | Performed by: NURSE PRACTITIONER

## 2024-03-27 PROCEDURE — 99213 OFFICE O/P EST LOW 20 MIN: CPT | Mod: 25 | Performed by: NURSE PRACTITIONER

## 2024-03-27 RX ORDER — ESTRADIOL 0.1 MG/G
1 CREAM VAGINAL AT BEDTIME
Qty: 42.5 G | Refills: 4 | Status: SHIPPED | OUTPATIENT
Start: 2024-03-27

## 2024-03-27 RX ORDER — METOPROLOL SUCCINATE 50 MG/1
TABLET, EXTENDED RELEASE ORAL
COMMUNITY

## 2024-03-27 ASSESSMENT — ANXIETY QUESTIONNAIRES
2. NOT BEING ABLE TO STOP OR CONTROL WORRYING: NOT AT ALL
7. FEELING AFRAID AS IF SOMETHING AWFUL MIGHT HAPPEN: NOT AT ALL
3. WORRYING TOO MUCH ABOUT DIFFERENT THINGS: NOT AT ALL
1. FEELING NERVOUS, ANXIOUS, OR ON EDGE: SEVERAL DAYS
6. BECOMING EASILY ANNOYED OR IRRITABLE: SEVERAL DAYS
IF YOU CHECKED OFF ANY PROBLEMS ON THIS QUESTIONNAIRE, HOW DIFFICULT HAVE THESE PROBLEMS MADE IT FOR YOU TO DO YOUR WORK, TAKE CARE OF THINGS AT HOME, OR GET ALONG WITH OTHER PEOPLE: NOT DIFFICULT AT ALL
GAD7 TOTAL SCORE: 3
GAD7 TOTAL SCORE: 3
5. BEING SO RESTLESS THAT IT IS HARD TO SIT STILL: NOT AT ALL

## 2024-03-27 ASSESSMENT — PATIENT HEALTH QUESTIONNAIRE - PHQ9
SUM OF ALL RESPONSES TO PHQ QUESTIONS 1-9: 4
5. POOR APPETITE OR OVEREATING: SEVERAL DAYS

## 2024-03-27 NOTE — PROGRESS NOTES
Adela is a 58 year old  female who presents for annual exam.     Besides routine health maintenance, she has no other health concerns today .    HPI:  The patient's PCP is  Fei Roldan MD.    Patient here today for her annual GYN exam.  She has yet to schedule her mammogram this year.  She has dense breast tissue and does need a 3D mammogram.  She is status post ALEX/BSO secondary to endometriosis and does have some upper right quadrant abdominal discomfort with position changes that could possibly mimic endometriosis/scar tissue discomfort.  This is nothing debilitating and does not stop her from completing her daily tasks.  She was started on vaginal estrogen cream back in  for vaginal atrophy and dyspareunia.  She had done really well initially then fell off the wagon and has avoidance dyspareunia again.  She is willing to restart.  She had issues with compliance but had no questions or concerns about the application process.      GYNECOLOGIC HISTORY:    No LMP recorded. Patient has had a hysterectomy.        Her current contraception method is: hysterectomy.  She  reports that she has never smoked. She has never used smokeless tobacco.    Patient is sexually active.  STD testing offered?  Declined  Last PHQ-9 score on record =       3/27/2024    10:59 AM   PHQ-9 SCORE   PHQ-9 Total Score 4     Last GAD7 score on record =       3/27/2024    10:59 AM   CAYLA-7 SCORE   Total Score 3     Alcohol Score = 0    HEALTH MAINTENANCE:    Pap:   Lab Results   Component Value Date    GYNINTERP  2022     Negative for Intraepithelial Lesion or Malignancy (NILM)    PAP NIL 2019      Health maintenance updated:  yes    Care Gaps    Overdue          Never done TSH W/FREE T4 REFLEX (Yearly)     Never done COLORECTAL CANCER SCREENING (View topic details)     Never done HIV SCREENING (Once)     Never done HEPATITIS C SCREENING (Once)     Never done HEPATITIS B IMMUNIZATION (1 of 3 - 19+ 3-dose series)     Never  done LIPID (Every 5 Years)     APR 10  2022 DTAP/TDAP/TD IMMUNIZATION (2 - Td or Tdap)  Last completed: Apr 10, 2012     NOV 20  2022 GLUCOSE (Every 3 Years)  Last completed: 2019     MAY 6  2023 MEDICARE ANNUAL WELLNESS VISIT (Yearly)  Last completed: May 6, 2022         Upcoming          MAY 6  2024 MAMMO SCREENING (Every 2 Years)  Last completed: May 6, 2022     NOV 18  2024 ADVANCE CARE PLANNING (Every 5 Years)  Last completed: 2019     MAY 6  2025 PAP (Every 3 Years)  Last completed: May 6, 2022       HISTORY:  OB History    Para Term  AB Living   1 1 0 1 0 0   SAB IAB Ectopic Multiple Live Births   0 0 0 0 0      # Outcome Date GA Lbr Jarrod/2nd Weight Sex Delivery Anes PTL Lv   1                 Patient Active Problem List   Diagnosis    Blood disorder    Vaginal Pap smear     Past Surgical History:   Procedure Laterality Date    APPENDECTOMY      APPENDECTOMY      AS REMOVAL OF RIB(S)      x 3... Thoracic outlet syndrome- , ,      SECTION      EXAM UNDER ANESTHESIA ANUS N/A 7/10/2020    Procedure: Exam under anesthesia anus;  Surgeon: Pily Henriquez MD;  Location: [facility]    EXAM UNDER ANESTHESIA ANUS N/A 10/14/2020    Procedure: EXAM UNDER ANESTHESIA, ANUS;  Surgeon: Pily Henriquez MD;  Location: [facility]    EXAM UNDER ANESTHESIA RECTUM N/A 2019    Procedure: EXAM UNDER ANESTHESIA;  Surgeon: Pily Henriquez MD;  Location: [facility]    EXAM UNDER ANESTHESIA RECTUM N/A 2020    Procedure: EXAM UNDER ANESTHESIA;  Surgeon: Pily Henriquez MD;  Location:  OR    FISTULOTOMY RECTUM N/A 2020    Procedure: FISTULOTOMY;  Surgeon: Pily Henriquez MD;  Location:  OR    GYN SURGERY      LAPAROSCOPIES X 3- , ,     HYSTERECTOMY TOTAL ABDOMINAL      HYSTERECTOMY VAGINAL, BILATERAL SALPINGO-OOPHERECTOMY, COMBINED      HYSTERECTOMY, PAP NO LONGER INDICATED      INJECT BOTOX N/A 2019    Procedure: PERIANAL  BOTOX (100 UNITS);  Surgeon: Pily Henriquez MD;  Location:  OR    INJECT BOTOX N/A 7/10/2020    Procedure: 1. Examination under anesthesia.  2. Anal Botox injection.;  Surgeon: Pily Henriquez MD;  Location: [facility] OR    RESECT FIRST RIB WITH SUBCLAVIAN VEIN PATCH      SOFT TISSUE SURGERY  2008    AXILLARY SEBACIOUS CYST REMOVED    SPHINCTEROTOMY RECTUM N/A 10/14/2020    Procedure: LATERAL INTERNAL SPHINCTEROTOMY;  Surgeon: Pily Henriquez MD;  Location: [facility] OR    THORACIC SURGERY Right 2002    1ST RIB REMOVED    TONSILLECTOMY  2008      Social History     Tobacco Use    Smoking status: Never    Smokeless tobacco: Never   Substance Use Topics    Alcohol use: Not Currently      Problem (# of Occurrences) Relation (Name,Age of Onset)    Diabetes (1) Maternal Grandmother    Heart Disease (1) Maternal Grandfather    Hypertension (1) Father: mild              Current Outpatient Medications   Medication Sig    amitriptyline (ELAVIL) 25 MG tablet Take 25 mg by mouth At Bedtime    aspirin (ASA) 325 MG tablet Take 1 tablet by mouth every 24 hours    estradiol (ESTRACE) 0.1 MG/GM vaginal cream Place 1 g vaginally at bedtime For 30 nights, then three times per week thereafter. Use finger for application.    levothyroxine (SYNTHROID/LEVOTHROID) 75 MCG tablet Take 75 mcg by mouth daily    loratadine (CLARITIN) 10 MG tablet Take 10 mg by mouth daily    LORazepam (ATIVAN) 0.5 MG tablet Take 0.5 mg by mouth as needed     metoprolol succinate ER (TOPROL XL) 50 MG 24 hr tablet METOPROLOL SUCCINATE ER 50 MG XR24H-TAB    Multiple Minerals-Vitamins (CITRACAL PLUS PO)     polyethylene glycol (MIRALAX/GLYCOLAX) packet Take 1 packet by mouth daily    Probiotic Product (ALIGN) 4 MG CAPS Take 1 capsule by mouth daily    VITAMIN D PO Take 2,000 Units by mouth     No current facility-administered medications for this visit.     Allergies   Allergen Reactions    Morphine And Related GI Disturbance    Sulfa Antibiotics Rash     "Codeine Nausea    Shellfish-Derived Products      Chest tightness    Sulfa Antibiotics Rash       Past medical, surgical, social and family histories were reviewed and updated in EPIC.    ROS:   12 point review of systems negative other than symptoms noted below or in the HPI.  No urinary frequency or dysuria, bladder or kidney problems    EXAM:  /72   Ht 1.824 m (5' 11.8\")   Wt 90.4 kg (199 lb 3.2 oz)   BMI 27.17 kg/m     BMI: Body mass index is 27.17 kg/m .    PHYSICAL EXAM:  Constitutional:   Appearance: Well nourished, well developed, alert, in no acute distress  Breasts: Inspection of Breasts:  No lymphadenopathy present., Palpation of Breasts and Axillae:  No masses present on palpation, no breast tenderness., Axillary Lymph Nodes:  No lymphadenopathy present., No nodularity, asymmetry or nipple discharge bilaterally., and extremely dense bilaterally    Neurologic:    Mental Status:  Oriented X3.  Normal strength and tone, sensory exam                grossly normal, mentation intact and speech normal.    Psychiatric:   Mentation appears normal and affect normal/bright.         Pelvic Exam:  External Genitalia:     Normal appearance for age, no discharge present, no tenderness present, no inflammatory lesions present, color normal  Vagina:     Normal vaginal vault without central or paravaginal defects, no discharge present, no inflammatory lesions present, no masses present.  Atrophic  Bladder:     Nontender to palpation  Urethra:   Urethral Body:  Urethra palpation normal, urethra structural support normal   Urethral Meatus:  No erythema or lesions present  Cervix:     Surgically absent  Uterus:     Surgically absent  Adnexa:     Surgically absent  Perineum:     Perineum within normal limits, no evidence of trauma, no rashes or skin lesions present  Anus:     Anus within normal limits, no hemorrhoids present  Inguinal Lymph Nodes:     No lymphadenopathy present    COUNSELING:   Special attention " given to:        Regular exercise       Healthy diet/nutrition       Osteoporosis prevention/bone health       (Jannie)menopause management    BMI: Body mass index is 27.17 kg/m .  Weight management plan: Discussed healthy diet and exercise guidelines    ASSESSMENT:  58 year old female with satisfactory annual exam.    ICD-10-CM    1. Encounter for gynecological examination without abnormal finding  Z01.419       2. Vaginal atrophy  N95.2 estradiol (ESTRACE) 0.1 MG/GM vaginal cream          PLAN:  58-year-old postmenopausal female with a normal postmenopausal GYN exam.  We have recommended that she restart her vaginal estrogen cream.  We have asked her to schedule her 3D mammogram on her way out.  Lysis of adhesions was briefly discussed with her.  Pap smear will be due next year.    APRIL Stoner CNP

## 2024-04-03 ENCOUNTER — ANCILLARY PROCEDURE (OUTPATIENT)
Dept: MAMMOGRAPHY | Facility: CLINIC | Age: 59
End: 2024-04-03
Payer: MEDICARE

## 2024-04-03 DIAGNOSIS — Z12.31 VISIT FOR SCREENING MAMMOGRAM: ICD-10-CM

## 2024-04-03 PROCEDURE — 77063 BREAST TOMOSYNTHESIS BI: CPT | Mod: TC | Performed by: STUDENT IN AN ORGANIZED HEALTH CARE EDUCATION/TRAINING PROGRAM

## 2024-04-03 PROCEDURE — 77067 SCR MAMMO BI INCL CAD: CPT | Mod: TC | Performed by: STUDENT IN AN ORGANIZED HEALTH CARE EDUCATION/TRAINING PROGRAM

## 2025-03-27 ENCOUNTER — ANCILLARY ORDERS (OUTPATIENT)
Dept: OBGYN | Facility: CLINIC | Age: 60
End: 2025-03-27
Payer: MEDICARE

## 2025-03-27 DIAGNOSIS — Z12.31 VISIT FOR SCREENING MAMMOGRAM: Primary | ICD-10-CM

## 2025-04-07 ENCOUNTER — ANCILLARY PROCEDURE (OUTPATIENT)
Dept: MAMMOGRAPHY | Facility: CLINIC | Age: 60
End: 2025-04-07
Payer: COMMERCIAL

## 2025-04-07 ENCOUNTER — OFFICE VISIT (OUTPATIENT)
Dept: OBGYN | Facility: CLINIC | Age: 60
End: 2025-04-07
Payer: COMMERCIAL

## 2025-04-07 VITALS
HEIGHT: 71 IN | WEIGHT: 191.6 LBS | SYSTOLIC BLOOD PRESSURE: 128 MMHG | DIASTOLIC BLOOD PRESSURE: 74 MMHG | BODY MASS INDEX: 26.82 KG/M2

## 2025-04-07 DIAGNOSIS — N95.2 VAGINAL ATROPHY: ICD-10-CM

## 2025-04-07 DIAGNOSIS — Z01.419 ENCOUNTER FOR GYNECOLOGICAL EXAMINATION WITHOUT ABNORMAL FINDING: Primary | ICD-10-CM

## 2025-04-07 DIAGNOSIS — Z11.51 ENCOUNTER FOR SCREENING FOR HUMAN PAPILLOMAVIRUS (HPV): ICD-10-CM

## 2025-04-07 DIAGNOSIS — Z12.72 VAGINAL PAP SMEAR: ICD-10-CM

## 2025-04-07 DIAGNOSIS — Z12.31 VISIT FOR SCREENING MAMMOGRAM: ICD-10-CM

## 2025-04-07 PROCEDURE — 77063 BREAST TOMOSYNTHESIS BI: CPT | Mod: TC | Performed by: RADIOLOGY

## 2025-04-07 PROCEDURE — 87624 HPV HI-RISK TYP POOLED RSLT: CPT | Performed by: NURSE PRACTITIONER

## 2025-04-07 PROCEDURE — 3078F DIAST BP <80 MM HG: CPT | Performed by: NURSE PRACTITIONER

## 2025-04-07 PROCEDURE — 3074F SYST BP LT 130 MM HG: CPT | Performed by: NURSE PRACTITIONER

## 2025-04-07 PROCEDURE — 77067 SCR MAMMO BI INCL CAD: CPT | Mod: TC | Performed by: RADIOLOGY

## 2025-04-07 PROCEDURE — 99459 PELVIC EXAMINATION: CPT | Performed by: NURSE PRACTITIONER

## 2025-04-07 PROCEDURE — 99396 PREV VISIT EST AGE 40-64: CPT | Performed by: NURSE PRACTITIONER

## 2025-04-07 RX ORDER — NALTREXONE HCL 1.5 MG
4.5 CAPSULE ORAL
COMMUNITY

## 2025-04-07 RX ORDER — ESTRADIOL 0.1 MG/G
1 CREAM VAGINAL
Qty: 42.5 G | Refills: 4 | Status: SHIPPED | OUTPATIENT
Start: 2025-04-07

## 2025-04-07 RX ORDER — DEXTROAMPHETAMINE SACCHARATE, AMPHETAMINE ASPARTATE MONOHYDRATE, DEXTROAMPHETAMINE SULFATE AND AMPHETAMINE SULFATE 5; 5; 5; 5 MG/1; MG/1; MG/1; MG/1
CAPSULE, EXTENDED RELEASE ORAL
COMMUNITY
Start: 2025-03-19

## 2025-04-07 RX ORDER — LOSARTAN POTASSIUM 100 MG/1
100 TABLET ORAL
COMMUNITY

## 2025-04-07 RX ORDER — FAMOTIDINE 20 MG/1
20 TABLET, FILM COATED ORAL 2 TIMES DAILY
COMMUNITY

## 2025-04-07 RX ORDER — DILTIAZEM HYDROCHLORIDE 240 MG/1
CAPSULE, EXTENDED RELEASE ORAL
COMMUNITY
Start: 2025-01-20

## 2025-04-07 ASSESSMENT — ANXIETY QUESTIONNAIRES
IF YOU CHECKED OFF ANY PROBLEMS ON THIS QUESTIONNAIRE, HOW DIFFICULT HAVE THESE PROBLEMS MADE IT FOR YOU TO DO YOUR WORK, TAKE CARE OF THINGS AT HOME, OR GET ALONG WITH OTHER PEOPLE: NOT DIFFICULT AT ALL
5. BEING SO RESTLESS THAT IT IS HARD TO SIT STILL: NOT AT ALL
1. FEELING NERVOUS, ANXIOUS, OR ON EDGE: NOT AT ALL
GAD7 TOTAL SCORE: 0
6. BECOMING EASILY ANNOYED OR IRRITABLE: NOT AT ALL
2. NOT BEING ABLE TO STOP OR CONTROL WORRYING: NOT AT ALL
3. WORRYING TOO MUCH ABOUT DIFFERENT THINGS: NOT AT ALL
GAD7 TOTAL SCORE: 0
7. FEELING AFRAID AS IF SOMETHING AWFUL MIGHT HAPPEN: NOT AT ALL

## 2025-04-07 ASSESSMENT — PATIENT HEALTH QUESTIONNAIRE - PHQ9
5. POOR APPETITE OR OVEREATING: NOT AT ALL
SUM OF ALL RESPONSES TO PHQ QUESTIONS 1-9: 2

## 2025-04-07 NOTE — PROGRESS NOTES
Adela is a 59 year old  female who presents for annual exam.     Besides routine health maintenance, she has no other health concerns today .    HPI:  The patient's PCP is  Fei Roldan MD. patient here today for her annual GYN exam and mammogram.  Her last bone scan was with endocrinology in .  She sees them in a couple of weeks for thyroid follow-up.  She was recently diagnosed with Kary-Danlos.  She does have some pain with deep penetration.  She has a history of endometriosis and is status post ALEX/BSO.  We were completing a vaginal Pap smear for her today.  She has had no vaginal bleeding.  She has been doing well with her vaginal estrogen cream.      GYNECOLOGIC HISTORY:    No LMP recorded. Patient has had a hysterectomy.        Her current contraception method is: menopause.  She  reports that she has never smoked. She has never used smokeless tobacco.    Patient is sexually active.  STD testing offered?  Declined  Last PHQ-9 score on record =       3/27/2024    10:59 AM   PHQ-9 SCORE   PHQ-9 Total Score 4     Last GAD7 score on record =       3/27/2024    10:59 AM   CAYLA-7 SCORE   Total Score 3         HEALTH MAINTENANCE:    Pap:   Lab Results   Component Value Date    GYNINTERP  2022     Negative for Intraepithelial Lesion or Malignancy (NILM)    PAP NIL 2019       Health maintenance updated:  yes    Care Gaps    Overdue          Never done TSH W/FREE T4 REFLEX (Yearly)     Never done HIV SCREENING (Once)     Never done HEPATITIS C SCREENING (Once)     Never done HEPATITIS B IMMUNIZATION (1 of 3 - 19+ 3-dose series)     Never done LIPID (Every 5 Years)     MAY 1  2016 Pneumococcal Vaccine: 50+ Years (2 of 2 - PCV)  Last completed: Apr 10, 2012     APR 10  2022 DTAP/TDAP/TD IMMUNIZATION (2 - Td or Tdap)  Last completed: Apr 10, 2012     NOV 20  2022 DIABETES SCREENING (Every 3 Years)  Last completed: 2019     SEP 1  2024 INFLUENZA VACCINE (1)  Last completed: 2023      SEP 1  2024 COVID-19 Vaccine ( season)  Last completed: Oct 19, 2023     NOV 18  2024 ADVANCE CARE PLANNING (Every 5 Years)  Last completed:  PHQ-2 (once per calendar year) (Yearly, January to December)  Last completed: Mar 27, 2024     MAR 27  2025 MEDICARE ANNUAL WELLNESS VISIT (Yearly)  Last completed: Mar 27, 2024         Due Soon          MAY 6  2025 PAP (Every 3 Years)  Last completed: May 6, 2022         Upcoming          APR 3  2026 MAMMO SCREENING (Every 2 Years)   Scheduled for: 2025     OCT 16  2026 COLORECTAL CANCER SCREENING (COLONOSCOPY - Required) (Every 3 Years)  Last completed: Dec 12, 2018       HISTORY:  OB History    Para Term  AB Living   1 1 0 1 0 0   SAB IAB Ectopic Multiple Live Births   0 0 0 0 0      # Outcome Date GA Lbr Jarrod/2nd Weight Sex Type Anes PTL Lv   1                 Patient Active Problem List   Diagnosis    Blood disorder    Vaginal Pap smear     Past Surgical History:   Procedure Laterality Date    APPENDECTOMY      APPENDECTOMY      AS REMOVAL OF RIB(S)      x 3... Thoracic outlet syndrome- , , 2005     SECTION      EXAM UNDER ANESTHESIA ANUS N/A 7/10/2020    Procedure: Exam under anesthesia anus;  Surgeon: Pily Henriquez MD;  Location:  OR    EXAM UNDER ANESTHESIA ANUS N/A 10/14/2020    Procedure: EXAM UNDER ANESTHESIA, ANUS;  Surgeon: Pily Henriquez MD;  Location:  OR    EXAM UNDER ANESTHESIA RECTUM N/A 2019    Procedure: EXAM UNDER ANESTHESIA;  Surgeon: Pily Henriquez MD;  Location:  OR    EXAM UNDER ANESTHESIA RECTUM N/A 2020    Procedure: EXAM UNDER ANESTHESIA;  Surgeon: Pily Henriquez MD;  Location:  OR    FISTULOTOMY RECTUM N/A 2020    Procedure: FISTULOTOMY;  Surgeon: Pily Henriquez MD;  Location:  OR    GYN SURGERY      LAPAROSCOPIES X 3- , ,     HYSTERECTOMY TOTAL ABDOMINAL      HYSTERECTOMY VAGINAL,  BILATERAL SALPINGO-OOPHERECTOMY, COMBINED      HYSTERECTOMY, PAP NO LONGER INDICATED      INJECT BOTOX N/A 11/20/2019    Procedure: PERIANAL BOTOX (100 UNITS);  Surgeon: Pily Henriquez MD;  Location: [facility]    INJECT BOTOX N/A 7/10/2020    Procedure: 1. Examination under anesthesia.  2. Anal Botox injection.;  Surgeon: Pily Henriquez MD;  Location: [facility]    RESECT FIRST RIB WITH SUBCLAVIAN VEIN PATCH      SOFT TISSUE SURGERY  2008    AXILLARY SEBACIOUS CYST REMOVED    SPHINCTEROTOMY RECTUM N/A 10/14/2020    Procedure: LATERAL INTERNAL SPHINCTEROTOMY;  Surgeon: Pily Henriquez MD;  Location: [facility]    THORACIC SURGERY Right 2002    1ST RIB REMOVED    TONSILLECTOMY  2008      Social History     Tobacco Use    Smoking status: Never    Smokeless tobacco: Never   Substance Use Topics    Alcohol use: Not Currently      Problem (# of Occurrences) Relation (Name,Age of Onset)    Diabetes (1) Maternal Grandmother    Heart Disease (1) Maternal Grandfather    Hypertension (1) Father: mild              Current Outpatient Medications   Medication Sig Dispense Refill    amitriptyline (ELAVIL) 25 MG tablet Take 25 mg by mouth At Bedtime      amphetamine-dextroamphetamine (ADDERALL XR) 20 MG 24 hr capsule       aspirin (ASA) 325 MG tablet Take 1 tablet by mouth every 24 hours      diltiazem ER (TIAZAC) 240 MG 24 hr ER beaded capsule       estradiol (ESTRACE) 0.1 MG/GM vaginal cream Place 1 g vaginally three times a week. Use finger for application. 42.5 g 4    famotidine (PEPCID) 20 MG tablet Take 20 mg by mouth 2 times daily.      levothyroxine (SYNTHROID/LEVOTHROID) 75 MCG tablet Take 88 mcg by mouth daily.      loratadine (CLARITIN) 10 MG tablet Take 10 mg by mouth daily      LORazepam (ATIVAN) 0.5 MG tablet Take 0.5 mg by mouth as needed       polyethylene glycol (MIRALAX/GLYCOLAX) packet Take 1 packet by mouth daily      Probiotic Product (ALIGN) 4 MG CAPS Take 1 capsule by mouth daily      VITAMIN D PO Take  "2,000 Units by mouth      losartan (COZAAR) 100 MG tablet Take 100 mg by mouth.      Multiple Minerals-Vitamins (CITRACAL PLUS PO)  (Patient not taking: Reported on 4/7/2025)      Naltrexone HCl, Pain, (LOTREXONE) 1.5 MG CAPS Take 4.5 mg by mouth.       No current facility-administered medications for this visit.     Allergies   Allergen Reactions    Morphine And Codeine GI Disturbance    Sulfa Antibiotics Rash    Codeine Nausea    Shellfish-Derived Products      Chest tightness    Sulfa Antibiotics Rash       Past medical, surgical, social and family histories were reviewed and updated in EPIC.    ROS:   12 point review of systems negative other than symptoms noted below or in the HPI.  No urinary frequency or dysuria, bladder or kidney problems    EXAM:  /74   Ht 1.803 m (5' 11\")   Wt 86.9 kg (191 lb 9.6 oz)   BMI 26.72 kg/m     BMI: Body mass index is 26.72 kg/m .    PHYSICAL EXAM:  Constitutional:   Appearance: Well nourished, well developed, alert, in no acute distress  Breasts: Inspection of Breasts:  No lymphadenopathy present., Palpation of Breasts and Axillae:  No masses present on palpation, no breast tenderness., Axillary Lymph Nodes:  No lymphadenopathy present., and No nodularity, asymmetry or nipple discharge bilaterally.  Lymphatic: Lymph Nodes:  No other lymphadenopathy present  Skin:  General Inspection:  No rashes present, no lesions present, no areas of  discoloration  Neurologic:    Mental Status:  Oriented X3.  Normal strength and tone, sensory exam                grossly normal, mentation intact and speech normal.    Psychiatric:   Mentation appears normal and affect normal/bright.         Pelvic Exam:  External Genitalia:     Normal appearance for age, no discharge present, no tenderness present, no inflammatory lesions present, color normal  Vagina:     Normal vaginal vault without central or paravaginal defects, no discharge present, no inflammatory lesions present, no masses " present  Bladder:     Nontender to palpation  Urethra:   Urethral Body:  Urethra palpation normal, urethra structural support normal   Urethral Meatus:  No erythema or lesions present  Cervix:     Surgically absent  Uterus:     Surgically absent  Adnexa:     Surgically absent  Perineum:     Perineum within normal limits, no evidence of trauma, no rashes or skin lesions present  Anus:     Anus within normal limits, no hemorrhoids present  Inguinal Lymph Nodes:     No lymphadenopathy present    COUNSELING:   Special attention given to:        Regular exercise       Healthy diet/nutrition       Osteoporosis prevention/bone health       (Jannie)menopause management    BMI: Body mass index is 26.72 kg/m .      ASSESSMENT:  59 year old female with satisfactory annual exam.    ICD-10-CM    1. Encounter for gynecological examination without abnormal finding  Z01.419       2. Vaginal atrophy  N95.2 estradiol (ESTRACE) 0.1 MG/GM vaginal cream      3. Vaginal Pap smear  Z12.72 HPV and Gynecologic Cytology Panel - Recommended Age 30 - 65 Years      4. Encounter for screening for human papillomavirus (HPV)  Z11.51 HPV and Gynecologic Cytology Panel - Recommended Age 30 - 65 Years          PLAN:  59-year-old postmenopausal female with a normal postmenopausal GYN exam.  Vaginal Pap smear was collected and if it is normal she can repeat in 3 years.  She is to continue with annual mammograms.  We have asked her to have her bone scan updated with endocrinology.  She is okay to continue on her vaginal estrogen cream for 1 year.    APRIL Stoner CNP

## 2025-04-08 LAB
HPV HR 12 DNA CVX QL NAA+PROBE: NEGATIVE
HPV16 DNA CVX QL NAA+PROBE: NEGATIVE
HPV18 DNA CVX QL NAA+PROBE: NEGATIVE
HUMAN PAPILLOMA VIRUS FINAL DIAGNOSIS: NORMAL

## 2025-04-13 LAB
BKR AP ASSOCIATED HPV REPORT: ABNORMAL
BKR LAB AP GYN ADEQUACY: ABNORMAL
BKR LAB AP GYN INTERPRETATION: ABNORMAL
BKR LAB AP PREVIOUS ABNORMAL: ABNORMAL
PATH REPORT.COMMENTS IMP SPEC: ABNORMAL
PATH REPORT.COMMENTS IMP SPEC: ABNORMAL
PATH REPORT.RELEVANT HX SPEC: ABNORMAL

## 2025-04-14 ENCOUNTER — PATIENT OUTREACH (OUTPATIENT)
Dept: OBGYN | Facility: CLINIC | Age: 60
End: 2025-04-14
Payer: MEDICARE

## (undated) DEVICE — SYR 03ML LL W/O NDL 309657

## (undated) DEVICE — SOL WATER IRRIG 1000ML BOTTLE 2F7114

## (undated) DEVICE — PREP POVIDONE IODINE SOLUTION 10% 4OZ BOTTLE 29906-004

## (undated) DEVICE — PREP SKIN SCRUB TRAY 4461A

## (undated) DEVICE — SUCTION CANISTER MEDIVAC LINER 3000ML W/LID 65651-530

## (undated) DEVICE — SPONGE BALL KERLIX ROUND XL W/O STRING LATEX 4935

## (undated) DEVICE — SYR EAR BULB 3OZ 0035830

## (undated) DEVICE — ESU PENCIL W/HOLSTER E2350H

## (undated) DEVICE — SUCTION TIP YANKAUER W/O VENT K86

## (undated) DEVICE — GOWN LG DISP 9515

## (undated) DEVICE — GLOVE PROTEXIS BLUE W/NEU-THERA 6.5  2D73EB65

## (undated) DEVICE — SOL NACL 0.9% 30ML VIAL

## (undated) DEVICE — LINEN TOWEL PACK X5 5464

## (undated) DEVICE — DECANTER VIAL 2006S

## (undated) DEVICE — LINEN TOWEL PACK X10 5473

## (undated) DEVICE — VESSEL LOOPS RED MAXI

## (undated) DEVICE — SYR 05ML LL W/O NDL

## (undated) DEVICE — NDL 27GA 1.25" 305136

## (undated) DEVICE — PACK MINOR CUSTOM RIDGES SBA32RMRMA

## (undated) DEVICE — SU CHROMIC 3-0 SH 27" G122H

## (undated) DEVICE — ESU PENCIL W/SMOKE EVAC CVPLP2000

## (undated) DEVICE — SOL NACL 0.9% IRRIG 1000ML BOTTLE 2F7124

## (undated) DEVICE — DRAPE MINOR PROCEDURE LAP 29496

## (undated) DEVICE — NDL 19GA 1.5"

## (undated) DEVICE — SYR 10ML FINGER CONTROL W/O NDL 309695

## (undated) DEVICE — DRSG ABDOMINAL 07 1/2X8" 7197D

## (undated) DEVICE — GLOVE PROTEXIS W/NEU-THERA 6.5  2D73TE65

## (undated) DEVICE — BAG CLEAR TRASH 1.3M 39X33" P4040C

## (undated) DEVICE — LINEN FULL SHEET 5511

## (undated) DEVICE — DRSG KERLIX FLUFFS X5

## (undated) DEVICE — LINEN HALF SHEET 5512

## (undated) DEVICE — PANTIES MESH LG/XLG 2PK 706M2

## (undated) DEVICE — NDL 30GA 1" 305128

## (undated) DEVICE — DRAPE SHEET REV FOLD 3/4 9349

## (undated) DEVICE — DRAPE MAYO STAND 23X54 8337

## (undated) DEVICE — GOWN IMPERVIOUS ZONED LG

## (undated) DEVICE — GLOVE PROTEXIS MICRO 6.0  2D73PM60

## (undated) DEVICE — DRAPE LEGGINGS 8421

## (undated) DEVICE — PACK MINOR SBA15MIFSE

## (undated) DEVICE — SYR 01ML 25GA 5/8" TBC

## (undated) DEVICE — GLOVE PROTEXIS MICRO 6.5  2D73PM65

## (undated) DEVICE — SU SILK 0 24" TIE SA76G

## (undated) RX ORDER — LIDOCAINE HYDROCHLORIDE AND EPINEPHRINE 10; 10 MG/ML; UG/ML
INJECTION, SOLUTION INFILTRATION; PERINEURAL
Status: DISPENSED
Start: 2020-07-10

## (undated) RX ORDER — ACETAMINOPHEN 325 MG/1
TABLET ORAL
Status: DISPENSED
Start: 2020-07-10

## (undated) RX ORDER — PROPOFOL 10 MG/ML
INJECTION, EMULSION INTRAVENOUS
Status: DISPENSED
Start: 2020-07-10

## (undated) RX ORDER — BUPIVACAINE HYDROCHLORIDE 5 MG/ML
INJECTION, SOLUTION EPIDURAL; INTRACAUDAL
Status: DISPENSED
Start: 2020-07-10

## (undated) RX ORDER — PROPOFOL 10 MG/ML
INJECTION, EMULSION INTRAVENOUS
Status: DISPENSED
Start: 2019-11-20

## (undated) RX ORDER — FENTANYL CITRATE 0.05 MG/ML
INJECTION, SOLUTION INTRAMUSCULAR; INTRAVENOUS
Status: DISPENSED
Start: 2020-10-14

## (undated) RX ORDER — DEXAMETHASONE SODIUM PHOSPHATE 4 MG/ML
INJECTION, SOLUTION INTRA-ARTICULAR; INTRALESIONAL; INTRAMUSCULAR; INTRAVENOUS; SOFT TISSUE
Status: DISPENSED
Start: 2020-02-05

## (undated) RX ORDER — LIDOCAINE HYDROCHLORIDE 20 MG/ML
INJECTION, SOLUTION EPIDURAL; INFILTRATION; INTRACAUDAL; PERINEURAL
Status: DISPENSED
Start: 2020-10-14

## (undated) RX ORDER — PROPOFOL 10 MG/ML
INJECTION, EMULSION INTRAVENOUS
Status: DISPENSED
Start: 2020-10-14

## (undated) RX ORDER — FENTANYL CITRATE 50 UG/ML
INJECTION, SOLUTION INTRAMUSCULAR; INTRAVENOUS
Status: DISPENSED
Start: 2020-07-10

## (undated) RX ORDER — GLYCOPYRROLATE 0.2 MG/ML
INJECTION, SOLUTION INTRAMUSCULAR; INTRAVENOUS
Status: DISPENSED
Start: 2020-02-05

## (undated) RX ORDER — ONDANSETRON 2 MG/ML
INJECTION INTRAMUSCULAR; INTRAVENOUS
Status: DISPENSED
Start: 2020-02-05

## (undated) RX ORDER — ACETAMINOPHEN 325 MG/1
TABLET ORAL
Status: DISPENSED
Start: 2020-02-05

## (undated) RX ORDER — ACETAMINOPHEN 160 MG
TABLET,DISINTEGRATING ORAL
Status: DISPENSED
Start: 2020-02-05

## (undated) RX ORDER — PROPOFOL 10 MG/ML
INJECTION, EMULSION INTRAVENOUS
Status: DISPENSED
Start: 2020-02-05

## (undated) RX ORDER — ACETAMINOPHEN 325 MG/1
TABLET ORAL
Status: DISPENSED
Start: 2019-11-20

## (undated) RX ORDER — ONDANSETRON 2 MG/ML
INJECTION INTRAMUSCULAR; INTRAVENOUS
Status: DISPENSED
Start: 2019-11-20

## (undated) RX ORDER — LIDOCAINE HYDROCHLORIDE 20 MG/ML
INJECTION, SOLUTION EPIDURAL; INFILTRATION; INTRACAUDAL; PERINEURAL
Status: DISPENSED
Start: 2020-02-05

## (undated) RX ORDER — FENTANYL CITRATE 50 UG/ML
INJECTION, SOLUTION INTRAMUSCULAR; INTRAVENOUS
Status: DISPENSED
Start: 2019-11-20

## (undated) RX ORDER — NEOSTIGMINE METHYLSULFATE 1 MG/ML
VIAL (ML) INJECTION
Status: DISPENSED
Start: 2020-02-05

## (undated) RX ORDER — DEXAMETHASONE SODIUM PHOSPHATE 4 MG/ML
INJECTION, SOLUTION INTRA-ARTICULAR; INTRALESIONAL; INTRAMUSCULAR; INTRAVENOUS; SOFT TISSUE
Status: DISPENSED
Start: 2020-07-10

## (undated) RX ORDER — FENTANYL CITRATE 50 UG/ML
INJECTION, SOLUTION INTRAMUSCULAR; INTRAVENOUS
Status: DISPENSED
Start: 2020-02-05

## (undated) RX ORDER — ACETAMINOPHEN 325 MG/1
TABLET ORAL
Status: DISPENSED
Start: 2020-10-14

## (undated) RX ORDER — ONDANSETRON 2 MG/ML
INJECTION INTRAMUSCULAR; INTRAVENOUS
Status: DISPENSED
Start: 2020-07-10

## (undated) RX ORDER — LIDOCAINE HYDROCHLORIDE 10 MG/ML
INJECTION, SOLUTION EPIDURAL; INFILTRATION; INTRACAUDAL; PERINEURAL
Status: DISPENSED
Start: 2020-07-10

## (undated) RX ORDER — ONDANSETRON 2 MG/ML
INJECTION INTRAMUSCULAR; INTRAVENOUS
Status: DISPENSED
Start: 2020-10-14